# Patient Record
Sex: FEMALE | Race: ASIAN
[De-identification: names, ages, dates, MRNs, and addresses within clinical notes are randomized per-mention and may not be internally consistent; named-entity substitution may affect disease eponyms.]

---

## 2019-03-18 ENCOUNTER — HOSPITAL ENCOUNTER (INPATIENT)
Dept: HOSPITAL 74 - FM/S | Age: 74
LOS: 3 days | Discharge: HOME | DRG: 483 | End: 2019-03-21
Attending: ORTHOPAEDIC SURGERY | Admitting: ORTHOPAEDIC SURGERY
Payer: COMMERCIAL

## 2019-03-18 VITALS — BODY MASS INDEX: 30.2 KG/M2

## 2019-03-18 DIAGNOSIS — D72.829: ICD-10-CM

## 2019-03-18 DIAGNOSIS — G47.00: ICD-10-CM

## 2019-03-18 DIAGNOSIS — M19.011: ICD-10-CM

## 2019-03-18 DIAGNOSIS — M75.101: Primary | ICD-10-CM

## 2019-03-18 DIAGNOSIS — M79.7: ICD-10-CM

## 2019-03-18 DIAGNOSIS — E87.1: ICD-10-CM

## 2019-03-18 DIAGNOSIS — K21.9: ICD-10-CM

## 2019-03-18 DIAGNOSIS — G25.81: ICD-10-CM

## 2019-03-18 DIAGNOSIS — B02.29: ICD-10-CM

## 2019-03-18 DIAGNOSIS — M75.21: ICD-10-CM

## 2019-03-18 LAB
ANION GAP SERPL CALC-SCNC: 8 MMOL/L (ref 8–16)
BUN SERPL-MCNC: 11 MG/DL (ref 7–18)
CALCIUM SERPL-MCNC: 8.7 MG/DL (ref 8.5–10)
CHLORIDE SERPL-SCNC: 106 MMOL/L (ref 98–107)
CO2 SERPL-SCNC: 21 MMOL/L (ref 21–32)
CREAT SERPL-MCNC: 0.7 MG/DL (ref 0.55–1.3)
DEPRECATED RDW RBC AUTO: 12.7 % (ref 11.6–15.6)
GLUCOSE SERPL-MCNC: 160 MG/DL (ref 74–106)
HCT VFR BLD CALC: 32.7 % (ref 32.4–45.2)
HGB BLD-MCNC: 10.4 GM/DL (ref 10.7–15.3)
MCH RBC QN AUTO: 27.5 PG (ref 25.7–33.7)
MCHC RBC AUTO-ENTMCNC: 31.8 G/DL (ref 32–36)
MCV RBC: 86.4 FL (ref 80–96)
PLATELET # BLD AUTO: 434 K/MM3 (ref 134–434)
PMV BLD: 9.1 FL (ref 7.5–11.1)
POTASSIUM SERPLBLD-SCNC: 4.2 MMOL/L (ref 3.5–5.1)
RBC # BLD AUTO: 3.78 M/MM3 (ref 3.6–5.2)
SODIUM SERPL-SCNC: 135 MMOL/L (ref 136–145)
WBC # BLD AUTO: 16.9 K/MM3 (ref 4–10.8)

## 2019-03-18 PROCEDURE — 0RRJ0JZ REPLACEMENT OF RIGHT SHOULDER JOINT WITH SYNTHETIC SUBSTITUTE, OPEN APPROACH: ICD-10-PCS | Performed by: ORTHOPAEDIC SURGERY

## 2019-03-18 PROCEDURE — 0RPJ0JZ REMOVAL OF SYNTHETIC SUBSTITUTE FROM RIGHT SHOULDER JOINT, OPEN APPROACH: ICD-10-PCS | Performed by: ORTHOPAEDIC SURGERY

## 2019-03-18 PROCEDURE — 0LS30ZZ REPOSITION RIGHT UPPER ARM TENDON, OPEN APPROACH: ICD-10-PCS | Performed by: ORTHOPAEDIC SURGERY

## 2019-03-18 RX ADMIN — DOCUSATE SODIUM SCH MG: 100 CAPSULE, LIQUID FILLED ORAL at 14:10

## 2019-03-18 RX ADMIN — OXYCODONE HYDROCHLORIDE SCH MG: 10 TABLET, FILM COATED, EXTENDED RELEASE ORAL at 21:29

## 2019-03-18 RX ADMIN — ACETAMINOPHEN SCH MG: 325 TABLET ORAL at 20:50

## 2019-03-18 RX ADMIN — DOCUSATE SODIUM SCH MG: 100 CAPSULE, LIQUID FILLED ORAL at 21:28

## 2019-03-18 RX ADMIN — CEFAZOLIN SODIUM SCH MLS/HR: 2 SOLUTION INTRAVENOUS at 16:15

## 2019-03-18 NOTE — OPR
Date of Procedure: 3/18/2019





Procedure: Right Shoulder-


1. Reverse total shoulder arthroplasty (03757)


2. Biceps tenodesis (16879)





Preoperative Diagnoses: 


1. Glenohumeral osteoarthritis


2. Rotator cuff tear


3. Biceps tendonitis/degeneration 





Postoperative Diagnoses: 


1. Glenohumeral osteoarthritis


2. Rotator cuff tear


3. Biceps tendonitis/degeneration 





Surgeon: Grover Vazquez DO





Assistants: Dileep Dyson DO





Anesthesia: General endotracheal anesthesia, IV regional with interscalene 

nerve block preop





Estimated Blood Loss: 50 cubic centimeters


       


Total IV Fluids: Per anesthesia record


       


Specimens: None


       


Implants: [employer] Humelock Reversed System


24 mm baseplate with +6 mm post extension


4.5 mm x 20 mm locking screw x 4


36 mm, 10-degree tilt centered glenosphere with 3.5 mm offset


32mm/8mm humeral stem


22 mm standard screw


+3 polyethylene insert with no build up





Complications:  None


       


Disposition: PACU


       


Condition: Hemodynamically stable





Indications: Nba Zavala presented to us with the above noted diagnoses. She 

ultimately elected to proceed with surgical intervention after discussion of 

the risks, benefits, alternatives. We discussed risks including but not limited 

to, bleeding, pain, infection, scarring, damage to neurovascular structures, 

blood clots, pulmonary embolus, need for additional surgery, complications of 

the prosthesis including breakage or failure, incomplete relief of pain, and 

incomplete return of function. She expressed understanding and wished to 

proceed. We also discussed the risks and benefits of a conservative or non-

surgical approach. We discussed risks including ongoing pain and dysfunction, 

or worsening pain and shoulder function.


 


She underwent preoperative medical evaluation, clearance, and optimization 

prior to surgery.





Procedure Details: 


She was identified in the preoperative area. The shoulder was marked as the 

operative site and consent was completed and confirmed. An interscalene block 

was performed by the anesthesia department. 


 


She was later transferred to the operating room and placed in supine position 

the operating room. General anesthesia was induced without difficulty. She was 

repositioned into beach chair with all bony prominences appropriately padded. 

The neck was in neutral alignment. 


 


A surgical time-out was performed identifying the correct patient, procedure, 

and site.


 


Antibiotics were given within 1 hour prior to surgical incision.


 


The upper extremity was prepped and draped in standard sterile fashion.





Reverse total shoulder arthroplasty with biceps tenodesis: 





Examination under anesthesia: Examination under anesthesia demonstrated stable 

supple shoulder. At the time of surgery, there was thinning of the 

supraspinatus and infraspinatus with a partial thickness tear of the 

supraspinatus. The subscapularis was attenuated, but intact. The humeral head 

was devoid of articular cartilage diffusely and it was significantly 

posteriorly subluxed. The glenoid face also had articular cartilage damage, 

significant posterior wear, and significant synovitis. The glenoid was of small 

size. There was significant inferior capsular scarring. The biceps tendon was 

degenerated. 





We began the procedure with a deltopectoral incision. Sharp dissection was 

carried down through the subcutaneous tissue down to the level of the fascia 

overlying the deltoid. Subcutaneous flaps were mobilized and developed and the 

deltopectoral interval was identified with cephalic vein, dissected and 

medially mobilized. Cephalic vein was protected throughout the case and was 

intact after the completion of the case. The biceps tendon was identified in 

the bicipital groove, it was clearly degenerated. We transected the biceps 

tendon distally and tenodesis into the upper border of the pectoralis major 

using a number 5 Ethibond suture in a figure of eight stitch fashion. Extra 

length of the tendon was then resected and it is entered into the rotator cuff 

interval. The supraspinatus and infraspinatus were completely absent and there 

was only some bursal and capsular tissue remaining. 





The subscapularis was then tagged with #5 Ethibond stitch and that was resected 

off the lesser tuberosity with electrocautery. Humeral head was then able to 

dislocate fully anteriorly as we completely removed the subscapularis and 

released the capsular insertion along the anatomic neck. 





A sagittal saw was then used to resect the anatomic neck of the humeral head 

and the canal was prepared with T-handle reamers and broaches. We then 

sequentially sized up to size 32/8 broach and found this to have a good fit. We 

used a planing reamer to clean up our cut to be in line with the stem. We set 

it to her natural retroversion of about 30 degrees. Attention was then directed 

towards the glenoid preparation. 





Fukuda retractor was placed and the humeral shaft was retracted posteriorly. 

Subscapularis was identified and the long anterior glenoid retractor was placed 

to identify the axillary nerve. We placed the hohmann retractor over and 

protected it throughout our dissection of the glenoid either directly with 

visualization or with palpation with our finger. We then performed the global 

release of the subscapularis releasing adhesion from beneath the coracoid into 

the rotator cuff interval and glenoid. After complete global release, we 

circumferentially resected the labrum and reset the triceps origin which gave 

excellent exposure of the glenoid. There were some osteophytes which were 

carefully resected. 





We used the peg guide for the center of the glenoid and we reamed the inferior 

portion for the preparation of the glenosphere baseplate with slight inferior 

inclination. We then used appropriate guides to drill the center peg hole and 

we finally were able to fit a 24mm, +6 mm peg baseplate. Baseplate was then 

inserted down the proper orientation. Screws were then drilled for the 4.5 mm 

screws. We drilled the anterior and posterior screws first followed by the 

superior and inferior locking screws with max length based on the depth gauge.  

The screws had excellent purchase and bite. We found the 36mm glenosphere with 

lateral offset to be appropriately sized for the patient. We thoroughly 

irrigated this area and ultimately selected the 36 mm, 10-degree tilt 

glenosphere with 3.5 mm offset. This was inserted and impacted down without 

difficulty and it fits nicely without any impingement. 





We then turned our attention to the humerus dislocating it anteriorly and tried 

different humeral components. Ultimately, we selected a +3 polyethylene insert 

with no build up and placed it on the humeral stem. We trialed this and found 

excellent range of motion. No gaping or shuck and we selected the real 

components. 





We then prepared the humeral canal after removing the trial broach stem and 

dislocating the prior reduction of trial components. I thoroughly irrigated the 

humeral canal and I prepared it for the impaction of real stem component. Drill 

holes were made within the bicipital groove along the insertion of the 

subscapularis and somewhat more medial such that we will not have to pull the 

subscapularis all the way to its native insertion at the lesser tuberosity and 

we medialized it with the repair to some extent. #5 Ethibond sutures were 

placed in the humeral holes. They were wrapped around the neck of the real 

component and the real component was seated nicely into the humeral canal with 

good stability and rotational stability. The humeral tray was then impacted in 

and dialed into the appropriate location and finally the polyethylene insert 

was placed. We then reduced the humerus on the glenoid and took the onset of 

range of motion without any evidence of instability or significant impingement.





The subscapularis was then repaired with the transosseous sutures in a Javier-

Eugene stitch configuration nicely repairing back into the anterior proximal 

humerus just covering the lesser tuberosity and medial to the lesser 

tuberosity. Extra length of the sutures were then cut and removed from the 

wound. A #5 Ethibond was also placed through the partially torn rotator cuff 

and this was repaired to the greater tuberosity via bone tunnels.





Remainder of the incision was closed with a #2 Ethibond stitch running fashion 

for the deltopectoral interval keeping the vein superior to this level and then 

the wound was closed in a layered fashion with buried 2-0 Vicryl stitches, 3-0 

Monocril skin closure, and then Dermabond. The shoulder was sterilely dressed, 

placed in a shoulder immobilizer. 





Post-operative Details: 


Postoperative rehabilitation: Reverse TSA protocol. She will remain in a sling 

for 4 weeks. PROM exercises for 4 weeks; No pendulums early. No strengthening 

for at least 4 months. 





Attestation for first assistant: Dr. Dileep Dyson DO acted as the first 

assistant. There was no qualified resident available to do so.

## 2019-03-19 LAB
ANION GAP SERPL CALC-SCNC: 8 MMOL/L (ref 8–16)
BASOPHILS # BLD: 0.2 % (ref 0–2)
BUN SERPL-MCNC: 16 MG/DL (ref 7–18)
CALCIUM SERPL-MCNC: 8.7 MG/DL (ref 8.5–10)
CHLORIDE SERPL-SCNC: 101 MMOL/L (ref 98–107)
CO2 SERPL-SCNC: 25 MMOL/L (ref 21–32)
CREAT SERPL-MCNC: 0.6 MG/DL (ref 0.55–1.3)
DEPRECATED RDW RBC AUTO: 12.8 % (ref 11.6–15.6)
EOSINOPHIL # BLD: 0 % (ref 0–4.5)
GLUCOSE SERPL-MCNC: 112 MG/DL (ref 74–106)
HCT VFR BLD CALC: 29.3 % (ref 32.4–45.2)
HGB BLD-MCNC: 9.5 GM/DL (ref 10.7–15.3)
LYMPHOCYTES # BLD: 11.2 % (ref 8–40)
MCH RBC QN AUTO: 28 PG (ref 25.7–33.7)
MCHC RBC AUTO-ENTMCNC: 32.4 G/DL (ref 32–36)
MCV RBC: 86.4 FL (ref 80–96)
MONOCYTES # BLD AUTO: 10 % (ref 3.8–10.2)
NEUTROPHILS # BLD: 78.6 % (ref 42.8–82.8)
PLATELET # BLD AUTO: 402 K/MM3 (ref 134–434)
PMV BLD: 9.6 FL (ref 7.5–11.1)
POTASSIUM SERPLBLD-SCNC: 4.4 MMOL/L (ref 3.5–5.1)
RBC # BLD AUTO: 3.39 M/MM3 (ref 3.6–5.2)
SODIUM SERPL-SCNC: 134 MMOL/L (ref 136–145)
WBC # BLD AUTO: 13 K/MM3 (ref 4–10.8)

## 2019-03-19 RX ADMIN — DOCUSATE SODIUM SCH MG: 100 CAPSULE, LIQUID FILLED ORAL at 21:46

## 2019-03-19 RX ADMIN — VENLAFAXINE SCH MG: 37.5 TABLET ORAL at 21:46

## 2019-03-19 RX ADMIN — ACETAMINOPHEN SCH MG: 325 TABLET ORAL at 02:00

## 2019-03-19 RX ADMIN — DOCUSATE SODIUM SCH MG: 100 CAPSULE, LIQUID FILLED ORAL at 13:41

## 2019-03-19 RX ADMIN — ASPIRIN SCH MG: 325 TABLET ORAL at 10:03

## 2019-03-19 RX ADMIN — ACETAMINOPHEN SCH MG: 325 TABLET ORAL at 21:46

## 2019-03-19 RX ADMIN — CEFAZOLIN SODIUM SCH MLS/HR: 2 SOLUTION INTRAVENOUS at 00:34

## 2019-03-19 RX ADMIN — OXYCODONE HYDROCHLORIDE SCH MG: 10 TABLET, FILM COATED, EXTENDED RELEASE ORAL at 21:46

## 2019-03-19 RX ADMIN — PANTOPRAZOLE SODIUM SCH MG: 40 TABLET, DELAYED RELEASE ORAL at 16:47

## 2019-03-19 RX ADMIN — ONDANSETRON PRN MG: 2 INJECTION INTRAMUSCULAR; INTRAVENOUS at 23:53

## 2019-03-19 RX ADMIN — VENLAFAXINE SCH MG: 37.5 TABLET ORAL at 11:39

## 2019-03-19 RX ADMIN — ROPINIROLE SCH MG: 1 TABLET, FILM COATED ORAL at 21:48

## 2019-03-19 RX ADMIN — SENNOSIDES SCH TAB: 8.6 TABLET, FILM COATED ORAL at 10:04

## 2019-03-19 RX ADMIN — OXYCODONE HYDROCHLORIDE SCH: 10 TABLET, FILM COATED, EXTENDED RELEASE ORAL at 10:04

## 2019-03-19 RX ADMIN — DOCUSATE SODIUM SCH MG: 100 CAPSULE, LIQUID FILLED ORAL at 06:24

## 2019-03-19 RX ADMIN — ACETAMINOPHEN SCH MG: 325 TABLET ORAL at 13:41

## 2019-03-19 RX ADMIN — ACETAMINOPHEN SCH MG: 325 TABLET ORAL at 08:41

## 2019-03-19 NOTE — PN
Progress Note (short form)





- Note


Progress Note: 





 ORTHOPEDIC SURGERY PROGRESS NOTE


                             Department of Orthopedic Surgery





SUBJECTIVE





No acute events overnight. No complaints currently. Denies chest pain, 

shortness of breath, or calf pain. No nausea or vomiting. Tolerating oral 

intake. Pain control difficult overnight, but improving. 





PHYSICAL EXAMINATION


General: Alert, oriented, cooperative and no distress.





Upper Extremity: Dressing intact; No drainage. Shoulder immobilizer; 

Compartments soft. M/R/U/MSK/AX motor intact; SILT distally; 2+ radial pulses; 

Cap refill brisk.





DVT Exam: No evidence of DVT seen on physical exam; No cords or calf tenderness

; No significant calf/ankle edema.








Intake & Output











 03/17/19 03/18/19 03/19/19





 23:59 23:59 23:59


 


Intake Total  2550 


 


Output Total  300 


 


Balance  2250 


 


Intake:   


 


  IV  1900 


 


  IVPB  250 


 


  Oral  400 


 


Output:   


 


  Urine  300 


 


    Void  300 


 


Other:   


 


  Voiding Method  Toilet Toilet


 


  Bowel Movement  No 


 


  Weight  155 lb 


 


  Height  5 ft 


 


  Body Mass Index (BMI)  30.2 


 


  Weight Measurement Method  Standing Scale 








Active Medications











Generic Name Dose Route Start Last Admin





  Trade Name Freq  PRN Reason Stop Dose Admin


 


Acetaminophen  650 mg  03/18/19 20:00  03/19/19 02:00





  Tylenol -  PO  03/21/19 19:59  650 mg





  Q6H JUSTIN   Administration





     





     





     





     


 


Al Hydroxide/Mg Hydroxide  30 ml  03/18/19 14:07  





  Mylanta Oral Suspension -  PO   





  Q6H PRN   





  INDIGESTION   





     





     





     


 


Aspirin  325 mg  03/19/19 10:00  





  Ecotrin -  PO   





  DAILY JUSTIN   





     





     





     





     


 


Docusate Sodium  100 mg  03/18/19 14:00  03/19/19 06:24





  Colace -  PO   100 mg





  TID JUSITN   Administration





     





     





     





     


 


Magnesium Hydroxide  30 ml  03/18/19 14:08  





  Milk Of Magnesia -  PO   





  DAILY PRN   





  CONSTIPATION   





     





     





     


 


Morphine Sulfate  4 mg  03/18/19 14:11  





  Morphine Sulfate  IVPUSH   





  Q6H PRN   





  PAIN LEVEL 6-10 BREAKTHRU PAIN   





     





     





     


 


Ondansetron HCl  4 mg  03/18/19 09:37  





  Zofran Injection  IVPUSH   





  Q6H PRN   





  NAUSEA AND/OR VOMITING   





     





     





     


 


Oxycodone HCl  10 mg  03/18/19 22:00  03/18/19 21:29





  Oxycontin -  PO   10 mg





  BID JUSTIN   Administration





     





     





     





     


 


Oxycodone HCl  5 mg  03/18/19 13:00  





  Roxicodone -  PO  03/21/19 13:00  





  Q4H PRN   





  PAIN LEVEL 1-5   





     





     





     


 


Oxycodone HCl  10 mg  03/18/19 13:02  03/18/19 18:15





  Roxicodone -  PO  03/21/19 13:02  10 mg





  Q4H PRN   Administration





  PAIN LEVEL 6-10   





     





     





     


 


Senna  2 tab  03/19/19 10:00  





  Senna -  PO   





  DAILY JUSTIN   





     





     





     





     








Vital Signs (last)











Temp Pulse Resp BP Pulse Ox


 


 98.2 F   82   18   98/62   95 


 


 03/19/19 06:04  03/19/19 06:04  03/19/19 06:04  03/19/19 06:04  03/19/19 06:04








Laboratory





 03/19/19 06:30 





 03/18/19 13:20 





IMAGING


Radiographs of the right shoulder show shoulder prosthesis is in good position. 

No evidence of loosening.


 


ASSESSMENT AND PLAN





Nba Zavala is a 73 year old female status post right reverse total shoulder 

arthroplasty. POD #1





Doing well postoperatively, progressing as expected.





- Pain control: minimize narcotic use


- DVT prophylaxis; SCDs; Aspirin 325 mg daily x 3 weeks


- Ice


- Elevate HOB, encourage oral intake


- PT/OT


- Dispo planning. Return to the office in 10 days.

## 2019-03-19 NOTE — PN
Progress Note (short form)





- Note


Progress Note: 





Anesthesiology:





POD #1 - s/p right total/reverse shoulder replacement. VSS. Pt. doing well, 

sitting up in bed receiving some rehab.


No complaints. No apparent anesthetic complications noted. Continue current 

care.

## 2019-03-19 NOTE — CONSULT
Consultation: 


REQUESTING PROVIDER: Dr. Grover Vazquez





CONSULT REQUEST: We have been asked to medically evaluate this patient post-

operatively.





HISTORY OF PRESENT ILLNESS:


73 year-old female with a PMH significant for fibromyalgia, post-herpetic 

neuralgia, restless leg syndrome, insomnia, GERD and degenerative joint disease 

s/p right reverse total shoulder replacement and bicep tendonesis on 3/18/19. 








REVIEW OF SYSTEMS:


CONSTITUTIONAL: 


Absent:  fever, chills, diaphoresis, generalized weakness, malaise, loss of 

appetite, weight change


HEENT: 


Absent:  rhinorrhea, nasal congestion, throat pain, throat swelling, difficulty 

swallowing, mouth swelling, ear pain, eye pain, visual changes


CARDIOVASCULAR: 


Absent: chest pain, syncope, palpitations, irregular heart rate, lightheadedness

, peripheral edema


RESPIRATORY: 


Absent: cough, shortness of breath, dyspnea with exertion, orthopnea, wheezing, 

stridor, hemoptysis


GASTROINTESTINAL:


Absent: abdominal pain, abdominal distension, nausea, vomiting, diarrhea, 

constipation, melena, hematochezia


GENITOURINARY: 


Absent: dysuria, frequency, urgency, hesitancy, hematuria, flank pain, genital 

pain


MUSCULOSKELETAL: 


+right shoulder pain and heaviness following surgery; pain is different in 

character and not as severe as pre-op 


Absent: myalgia, arthralgia, joint swelling, back pain, neck pain


SKIN: 


Absent: rash, itching, pallor


HEMATOLOGIC/IMMUNOLOGIC: 


Absent: easy bleeding, easy bruising, lymphadenopathy, frequent infections


ENDOCRINE:


Absent: unexplained weight gain, unexplained weight loss, heat intolerance, 

cold intolerance


NEUROLOGIC: 


+restless leg syndrome symptoms (did not get ropironole last night) with 

associated sleeplessness


Absent: headache, focal weakness or paresthesias, dizziness, unsteady gait, 

seizure, mental status changes, bladder or bowel incontinence


PSYCHIATRIC: 


Absent: anxiety, depression, suicidal or homicidal ideation, hallucinations.





PHYSICAL EXAMINATION





 Vital Signs - 24 hr











  03/18/19 03/18/19 03/18/19





  13:25 13:40 14:38


 


Temperature  98 F 98.3 F


 


Pulse Rate 96 H 96 H 92 H


 


Respiratory 22 H 21 H 18





Rate   


 


Blood Pressure 103/52 L 101/52 L 105/58 L


 


O2 Sat by Pulse 98  98





Oximetry (%)   














  03/18/19 03/18/19 03/19/19





  18:00 22:00 06:04


 


Temperature 98.3 F 98.5 F 98.2 F


 


Pulse Rate 80 89 82


 


Respiratory 16 17 18





Rate   


 


Blood Pressure 124/60 107/54 L 98/62


 


O2 Sat by Pulse 94 L 95 95





Oximetry (%)   














  03/19/19





  09:54


 


Temperature 98.3 F


 


Pulse Rate 89


 


Respiratory 19





Rate 


 


Blood Pressure 95/50 L


 


O2 Sat by Pulse 





Oximetry (%) 














GENERAL: Awake, alert, and fully oriented, in no acute distress.


LUNGS: Anterior breath sounds equal, clear to auscultation bilaterally. No 

wheezes, and no crackles. No accessory muscle use.


HEART: Regular rate and rhythm, S1 and S2 


ABDOMEN: Soft, not tender, not distended, normoactive bowel sounds, no guarding

, no rebound tenderness  


MUSCULOSKELETAL: Normal range of motion at all joints. No bony deformities or 

tenderness. No CVA tenderness.


RIGHT UPPER EXTREMITy: shoulder surgical dressing c/d/i, surgical wound not 

visualized; 2+ radial pulse, warm, well-perfused; sling


LOWER EXTREMITIES: 2+ pulses, warm, well-perfused. No calf tenderness. No 

peripheral edema. 


NEUROLOGICAL:  Cranial nerves II-XII intact. Normal speech. 








 Laboratory Results - last 24 hr











  03/18/19 03/18/19 03/19/19





  13:20 13:20 06:30


 


WBC  16.9 H   13.0 H


 


RBC  3.78   3.39 L


 


Hgb  10.4 L   9.5 L


 


Hct  32.7   29.3 L


 


MCV  86.4   86.4


 


MCH  27.5   28.0


 


MCHC  31.8 L   32.4


 


RDW  12.7   12.8


 


Plt Count  434   402


 


MPV  9.1   9.6


 


Absolute Neuts (auto)    10.2


 


Neutrophils %    78.6


 


Lymphocytes %    11.2


 


Monocytes %    10.0


 


Eosinophils %    0.0


 


Basophils %    0.2


 


Sodium   135 L 


 


Potassium   4.2 


 


Chloride   106 


 


Carbon Dioxide   21 


 


Anion Gap   8 


 


BUN   11 


 


Creatinine   0.7 


 


Creat Clearance w eGFR   82.49 


 


Random Glucose   160 H 


 


Calcium   8.7 














  03/19/19





  10:28


 


WBC 


 


RBC 


 


Hgb 


 


Hct 


 


MCV 


 


MCH 


 


MCHC 


 


RDW 


 


Plt Count 


 


MPV 


 


Absolute Neuts (auto) 


 


Neutrophils % 


 


Lymphocytes % 


 


Monocytes % 


 


Eosinophils % 


 


Basophils % 


 


Sodium  134 L


 


Potassium  4.4


 


Chloride  101


 


Carbon Dioxide  25


 


Anion Gap  8


 


BUN  16


 


Creatinine  0.6


 


Creat Clearance w eGFR  98.55


 


Random Glucose  112 H


 


Calcium  8.7








                           


 Current Medications











Generic Name Dose Route Start Last Admin





  Trade Name Freq  PRN Reason Stop Dose Admin


 


Acetaminophen  650 mg  03/18/19 20:00  03/19/19 13:41





  Tylenol -  PO  03/21/19 19:59  650 mg





  Q6H JUSTIN   Administration





     





     





     





     


 


Al Hydroxide/Mg Hydroxide  30 ml  03/18/19 14:07  





  Mylanta Oral Suspension -  PO   





  Q6H PRN   





  INDIGESTION   





     





     





     


 


Aspirin  325 mg  03/19/19 10:00  03/19/19 10:03





  Ecotrin -  PO   325 mg





  DAILY JUSTIN   Administration





     





     





     





     


 


Docusate Sodium  100 mg  03/18/19 14:00  03/19/19 13:41





  Colace -  PO   100 mg





  TID JUSTIN   Administration





     





     





     





     


 


Magnesium Hydroxide  30 ml  03/18/19 14:08  





  Milk Of Magnesia -  PO   





  DAILY PRN   





  CONSTIPATION   





     





     





     


 


Morphine Sulfate  4 mg  03/18/19 14:11  





  Morphine Sulfate  IVPUSH   





  Q6H PRN   





  PAIN LEVEL 6-10 BREAKTHRU PAIN   





     





     





     


 


Ondansetron HCl  4 mg  03/18/19 09:37  





  Zofran Injection  IVPUSH   





  Q6H PRN   





  NAUSEA AND/OR VOMITING   





     





     





     


 


Oxycodone HCl  10 mg  03/18/19 22:00  03/19/19 10:04





  Oxycontin -  PO   Not Given





  BID [facility]   





     





     





     





     


 


Oxycodone HCl  5 mg  03/18/19 13:00  





  Roxicodone -  PO  03/21/19 13:00  





  Q4H PRN   





  PAIN LEVEL 1-5   





     





     





     


 


Oxycodone HCl  10 mg  03/18/19 13:02  03/18/19 18:15





  Roxicodone -  PO  03/21/19 13:02  10 mg





  Q4H PRN   Administration





  PAIN LEVEL 6-10   





     





     





     


 


Pantoprazole Sodium  40 mg  03/19/19 16:15  





  Protonix -  PO   





  DAILY [facility]   





     





     





     





     


 


Pregabalin  75 mg  03/20/19 22:00  





  Lyrica -  PO   





  HS JUSTIN   





     





     





     





     


 


Ropinirole HCl  2 mg  03/19/19 22:00  





  Requip -  PO   





  HS JUSTIN   





     





     





     





     


 


Senna  2 tab  03/19/19 10:00  03/19/19 10:04





  Senna -  PO   2 tab





  DAILY JUSTIN   Administration





     





     





     





     


 


Venlafaxine HCl  37.5 mg  03/19/19 10:00  03/19/19 11:39





  Effexor -  PO   37.5 mg





  BID JUSTIN   Administration





     





     





     





     














ASSESSMENT/PLAN:


73 year-old female with a PMH significant for fibromyalgia, post-herpetic 

neuralgia, restless leg syndrome, GERD and degenerative joint disease s/p right 

reverse total shoulder replacement and bicep tendonesis on 3/18/19. 





Reverse total shoulder replacement and bicep tendonesis


                --POD #1


 --perioperative antibiotics complete (Vanc x 1, cefazolin x 2)


 --pain management per surgery


 --ASA 325mg daily


 --bowel regimen


 --incentive spirometry





Resless leg syndrome


   --did not get evening dose of ropironole last night and had difficult night


   --ropirinole 2mg at bedtime tonight





Fibromyalgia


Post-herpetic neuralgia


   --takes pregabalin/Lyrica 75mg at bedtime but received a dose this morning 

at 10:00am; hold this evening's dose


   --continue venlafaxine 





GERD


   --resume protonix





Low sodium


   --Na 134, down from 136 yesterday (corrected)


   --renal function is stable


   --monitor





Leukocytosis


   --likely reactive from surgery, trending down


   


FEN


 Fluids: PO intake adequate


 Electrolytes: replete as indicated


 Nutrition: regular diet





DVT prophylaxis: OOB, ambulation, SCDs, ASA 325mg daily





Physical therapy





Dispo: We will continue to follow the patient. Thank you for this consultative 

opportunity.











Visit type





- Emergency Visit


Emergency Visit: No





- New Patient


This patient is new to me today: Yes


Date on this admission: 03/19/19





- Critical Care


Critical Care patient: No

## 2019-03-19 NOTE — DS
Physical Examination


Vital Signs: 


 Vital Signs











Temperature  98.3 F   03/19/19 09:54


 


Pulse Rate  89   03/19/19 09:54


 


Respiratory Rate  19   03/19/19 09:54


 


Blood Pressure  95/50 L  03/19/19 09:54


 


O2 Sat by Pulse Oximetry (%)  95   03/19/19 06:04











Constitutional: Yes: Well Nourished, No Distress


Cardiovascular: Yes: Regular Rate and Rhythm, Other (/63)


Respiratory: Yes: Regular


Gastrointestinal: Yes: Soft


Peripheral Pulses: Left Radial: 2+, Right Radial: 2+


Wound/Incision: Yes: Clean/Dry, Well Approximated


Neurological: Yes: WNL


Labs: 


 CBC, BMP





 03/19/19 06:30 





 03/19/19 10:28 











Discharge Summary


Reason For Visit: RIGHT SHOULDER ADVANCED DEGENERATIVE DISEASE


Hospital Course: 


Nba Zavala was admitted on 3/18/2019 for elective joint replacement surgery. 

The patient was taken to the operating room and underwent a right reverse total 

shoulder arthoplasty. The patient tolerated the procedure without complications 

(see operative note for details).  After surgery, the patient was taken to the 

PACU, and when stable, transferred to the orthopedic unit for further care. The 

patient was given prophylactic antibiotics in the perioperative phase. Post-

operatively, the patient was placed on sequential compression devices and 

appropriate chemoprophylaxis for deep vein thrombosis prophylaxis along with 

early remobilization efforts. Intravenous pain medication was converted to oral 

pain medications which the patient tolerated well. The patient tolerated 

advancement to a regular diet. Bowel program was initiated. Bladder output was 

monitored. Incentive spirometry was given for deep breathing therapy. Physical 

therapy was provided and the patient was discharged per pathway.





Condition: Stable





- Instructions


Diet, Activity, Other Instructions: 


Change the bandage on your shoulder daily using clean, dry gauze and tape. 

After 5 to 7 days, you can leave the incision undressed if the area remains dry.





You should keep your incision dry (no shower or bath) until 5 days after your 

surgery, at which time you can begin to shower. Do not bathe, swim, or use hot 

tubs for at least 3 weeks after your surgery. Keep a clean wash cloth in your 

underarm (armpit) between showers to keep this area dry, absorb sweat, and 

prevent skin infections. Make sure you wash and completely dry this area daily.





You should wear the immobilizer on the outside of your clothes. A large Tshirt 

or button-down shirts are the easiest to get on. Wearing your brace on your 

bare skin may cause a rash. Make sure the arm strap and wrist strap are not too 

tight. You should be able to easily fit four fingers from your opposite hand 

between the straps and your skin. Some mild swelling of the entire arm the week 

after surgery is normal. If you are experiencing increased swelling of the arm 

between the straps, you are likely wearing the brace straps too tight.





You should wear the brace most of the day, unless you are showering or have 

released the straps to do your exercises, write/type, or feed yourself. You 

must wear your immobilizer at night for the first 5-6 weeks after your surgery. 

Always keep a pillow behind you elbow when sitting or sleeping for the first 6 

weeks after surgery. Note: If you are given an abduction pillow after surgery 

to wear instead of a brace, it is important that you do not let your arm get 

closer to your body than the pillow would normally allow. When removing the 

brace, an additional person must be available to hold the arm in the air.





Unless you are told differently, it is okay to do some lightweight activities 

with your arm while it is in the brace, as long as you keep the arm in proper 

alignment. You can use your hand and fingers to do activities like eating, 

reading a book, or typing on a computer, as long as the arm is kept in front of 

your abdomen and at table top level.





Do not reach out to the side, behind you, or away from your body. This motion 

is called external rotation and puts you at risk for dislocation of your 

prosthesis.





You should not lift anything heavier than a coffee cup (8 ounces). 





Driving: Do not drive until discussing this with the doctor at your first 

return visit to the office. You should be able to drive when you are no longer 

taking narcotic pain medications, and feel that you can control the wheel. This 

is around 3-4 weeks for most patients. Exercise: Do not run, bike, or do any 

other lower body workouts until after


you see the surgeon at the first postoperative visit at the office. Do not Fall

: Take all precautions possible to AVOID FALLING. See the preventing falls at 

home handout provided by your nurse.





You will have a prescription for pain medication to take home with you. It is 

important to take these medications as directed and to only take them as 

necessary for pain.


If the medicine is making you too sleepy or dizzy then cut back on the number 

of pills you are taking or do not take them as often. If you continue to have 

dizziness, please call your primary care physician or go to the emergency room.


Narcotic pain medicines can cause nausea or upset stomach, constipation and 

difficulty with urination (passing your urine). Taking these medicines with 

food may help if they are upsetting your stomach. If you are experiencing 

constipation, try drinking more water, maintaining a high fiber diet, and using 

over the counter stool softeners as directed to ease this side effect. If you 

are having problems urinating or are urinating only small amounts often, we 

recommend you contact your primary care doctor, your urologist (if you have one)

, or go to the emergency room.





Please do not lose your prescriptions or pain medicines. Do not drive or 

operate heavy machinery while taking these medications.





You should follow the instructions given to you during your hospital stay. 

Depending on what surgery was done, some patients may not be allowed to do all 

exercises. In most patients it is fine to release the immobilizer to move your 

fingers, wrist and elbow to prevent stiffness. Only move your shoulder as 

instructed by your therapist and surgeon.  You MAY NOT do pendulums.  It is OK 

for you to lean forward and have your arm dangle for cleaning the armpit. Also 

consider icing your shoulder after doing your exercises.





It is important to work on your finger/wrist/elbow range of motion 3-4 times a 

day for about 2-3 minutes at a time. Make sure you are working on getting your 

elbow completely straight, as it can stiffen up quickly.





The most common problem after a reverse total shoulder replacement is 

dislocation of the prosthesis. Dislocation is when the 2 pieces of the inserted 

hardware do not meet anymore. If you should suddenly feel the parts shift or 

have a sudden increase in pain, contact the office immediately. This pain will 

be different from the pain you had after surgery. To prevent this, it is very 

important that you do not use the arm more than you were instructed in the 

hospital. Do not let your elbow fall behind you and it is extremely important 

to have a pillow behind your elbow when sleeping or sitting to keep the elbow 

in front of your body. Call the office immediately (or go to the emergency room 

if not during office hours) if you have any of the following symptoms:


   Any drainage or bleeding from your incision.


   Fever of 101.5 F or higher


   Chills


   Numbness, tingling, or loss of feeling to the arm or fingertips that does 

not improve with repositioning.


   Increased pain that is not relieved by pain medicine





Go to the Emergency Room immediately if you experience any CHEST PAIN or 

SHORTNESS OF BREATH, as these symptoms can be a sign of a life threatening 

condition.





Disposition: HOME





- Home Medications


Comprehensive Discharge Medication List: 


Ambulatory Orders





Oxycodone 5 mg PO Q4 hours for pain as needed


Aspirin 325 mg PO DAILY for 21 days


Pantoprazole Sodium 40 mg PO DAILY 03/15/19 


Pregabalin [Lyrica] 75 mg PO HS 03/15/19 


Acetaminophen [Tylenol] 650 mg PO BID PRN 03/18/19

## 2019-03-20 LAB
ALBUMIN SERPL-MCNC: 2.9 G/DL (ref 3.4–5)
ALP SERPL-CCNC: 63 U/L (ref 45–117)
ALT SERPL-CCNC: 15 U/L (ref 13–61)
ANION GAP SERPL CALC-SCNC: 6 MMOL/L (ref 8–16)
AST SERPL-CCNC: 40 U/L (ref 15–37)
BASOPHILS # BLD: 0.3 % (ref 0–2)
BILIRUB SERPL-MCNC: 0.6 MG/DL (ref 0.2–1)
BUN SERPL-MCNC: 13 MG/DL (ref 7–18)
CALCIUM SERPL-MCNC: 8.4 MG/DL (ref 8.5–10)
CHLORIDE SERPL-SCNC: 101 MMOL/L (ref 98–107)
CO2 SERPL-SCNC: 25 MMOL/L (ref 21–32)
CREAT SERPL-MCNC: 0.6 MG/DL (ref 0.55–1.3)
DEPRECATED RDW RBC AUTO: 13.2 % (ref 11.6–15.6)
EOSINOPHIL # BLD: 0.2 % (ref 0–4.5)
GLUCOSE SERPL-MCNC: 125 MG/DL (ref 74–106)
HCT VFR BLD CALC: 29.1 % (ref 32.4–45.2)
HGB BLD-MCNC: 9.3 GM/DL (ref 10.7–15.3)
LYMPHOCYTES # BLD: 12.9 % (ref 8–40)
MAGNESIUM SERPL-MCNC: 1.9 MG/DL (ref 1.8–2.4)
MCH RBC QN AUTO: 27.6 PG (ref 25.7–33.7)
MCHC RBC AUTO-ENTMCNC: 31.9 G/DL (ref 32–36)
MCV RBC: 86.6 FL (ref 80–96)
MONOCYTES # BLD AUTO: 10.3 % (ref 3.8–10.2)
NEUTROPHILS # BLD: 76.3 % (ref 42.8–82.8)
PLATELET # BLD AUTO: 397 K/MM3 (ref 134–434)
PMV BLD: 9.6 FL (ref 7.5–11.1)
POTASSIUM SERPLBLD-SCNC: 4 MMOL/L (ref 3.5–5.1)
PROT SERPL-MCNC: 5.5 G/DL (ref 6.4–8.2)
RBC # BLD AUTO: 3.36 M/MM3 (ref 3.6–5.2)
SODIUM SERPL-SCNC: 132 MMOL/L (ref 136–145)
WBC # BLD AUTO: 13.2 K/MM3 (ref 4–10.8)

## 2019-03-20 RX ADMIN — ACETAMINOPHEN SCH MG: 325 TABLET ORAL at 13:47

## 2019-03-20 RX ADMIN — DOCUSATE SODIUM SCH MG: 100 CAPSULE, LIQUID FILLED ORAL at 13:47

## 2019-03-20 RX ADMIN — ROPINIROLE SCH MG: 1 TABLET, FILM COATED ORAL at 21:25

## 2019-03-20 RX ADMIN — VENLAFAXINE SCH MG: 37.5 TABLET ORAL at 10:36

## 2019-03-20 RX ADMIN — SODIUM CHLORIDE SCH MLS/HR: 9 INJECTION, SOLUTION INTRAVENOUS at 12:19

## 2019-03-20 RX ADMIN — PANTOPRAZOLE SODIUM SCH MG: 40 TABLET, DELAYED RELEASE ORAL at 10:36

## 2019-03-20 RX ADMIN — ACETAMINOPHEN SCH MG: 325 TABLET ORAL at 08:13

## 2019-03-20 RX ADMIN — ACETAMINOPHEN SCH MG: 325 TABLET ORAL at 20:00

## 2019-03-20 RX ADMIN — DOCUSATE SODIUM SCH MG: 100 CAPSULE, LIQUID FILLED ORAL at 06:46

## 2019-03-20 RX ADMIN — DOCUSATE SODIUM SCH MG: 100 CAPSULE, LIQUID FILLED ORAL at 21:24

## 2019-03-20 RX ADMIN — SENNOSIDES SCH TAB: 8.6 TABLET, FILM COATED ORAL at 10:37

## 2019-03-20 RX ADMIN — ONDANSETRON PRN MG: 2 INJECTION INTRAMUSCULAR; INTRAVENOUS at 08:11

## 2019-03-20 RX ADMIN — ACETAMINOPHEN SCH: 325 TABLET ORAL at 02:00

## 2019-03-20 RX ADMIN — OXYCODONE HYDROCHLORIDE SCH MG: 10 TABLET, FILM COATED, EXTENDED RELEASE ORAL at 21:24

## 2019-03-20 RX ADMIN — ASPIRIN SCH MG: 325 TABLET ORAL at 10:36

## 2019-03-20 RX ADMIN — VENLAFAXINE SCH MG: 37.5 TABLET ORAL at 21:24

## 2019-03-20 RX ADMIN — OXYCODONE HYDROCHLORIDE SCH: 10 TABLET, FILM COATED, EXTENDED RELEASE ORAL at 10:37

## 2019-03-20 NOTE — PN
Progress Note (short form)





- Note


Progress Note: 





 ORTHOPEDIC SURGERY PROGRESS NOTE


                             Department of Orthopedic Surgery





SUBJECTIVE





No acute events overnight. Complaining of some nausea this morning. No 

vomiting. Denies chest pain, shortness of breath, or calf pain. Tolerating oral 

intake. Pain control improving.  





PHYSICAL EXAMINATION


General: Alert, oriented, cooperative and no distress.





Upper Extremity: Dressing intact; No drainage. Shoulder immobilizer; 

Compartments soft. M/R/U/MSK/AX motor intact; SILT distally; 2+ radial pulses; 

Cap refill brisk.





DVT Exam: No evidence of DVT seen on physical exam; No cords or calf tenderness

; No significant calf/ankle edema.








Intake & Output











 03/18/19 03/19/19 03/20/19





 23:59 23:59 23:59


 


Intake Total 2550 1395 120


 


Output Total 300  


 


Balance 2250 1395 120


 


Intake:   


 


  IV 1900  


 


  IVPB 250  


 


  Oral 400 1395 120


 


Output:   


 


  Urine 300  


 


    Void 300  


 


Other:   


 


  Voiding Method Toilet Toilet Toilet


 


  # Unmeasured Voids   


 


    Void  1 100


 


  Bowel Movement No  No


 


  Weight 155 lb  


 


  Height 5 ft  


 


  Body Mass Index (BMI) 30.2  


 


  Weight Measurement Method Standing Scale  








Active Medications











Generic Name Dose Route Start Last Admin





  Trade Name Freq  PRN Reason Stop Dose Admin


 


Acetaminophen  650 mg  03/18/19 20:00  03/20/19 02:00





  Tylenol -  PO  03/21/19 19:59  Not Given





  Q6H JUSTIN   





     





     





     





     


 


Al Hydroxide/Mg Hydroxide  30 ml  03/18/19 14:07  





  Mylanta Oral Suspension -  PO   





  Q6H PRN   





  INDIGESTION   





     





     





     


 


Aspirin  325 mg  03/19/19 10:00  03/19/19 10:03





  Ecotrin -  PO   325 mg





  DAILY JUSTIN   Administration





     





     





     





     


 


Docusate Sodium  100 mg  03/18/19 14:00  03/20/19 06:46





  Colace -  PO   100 mg





  TID JUSTIN   Administration





     





     





     





     


 


Magnesium Hydroxide  30 ml  03/18/19 14:08  





  Milk Of Magnesia -  PO   





  DAILY PRN   





  CONSTIPATION   





     





     





     


 


Morphine Sulfate  4 mg  03/18/19 14:11  





  Morphine Sulfate  IVPUSH   





  Q6H PRN   





  PAIN LEVEL 6-10 BREAKTHRU PAIN   





     





     





     


 


Ondansetron HCl  4 mg  03/18/19 09:37  03/19/19 23:53





  Zofran Injection  IVPUSH   4 mg





  Q6H PRN   Administration





  NAUSEA AND/OR VOMITING   





     





     





     


 


Oxycodone HCl  10 mg  03/18/19 22:00  03/19/19 21:46





  Oxycontin -  PO   10 mg





  BID JUSTIN   Administration





     





     





     





     


 


Oxycodone HCl  5 mg  03/18/19 13:00  





  Roxicodone -  PO  03/21/19 13:00  





  Q4H PRN   





  PAIN LEVEL 1-5   





     





     





     


 


Oxycodone HCl  10 mg  03/18/19 13:02  03/18/19 18:15





  Roxicodone -  PO  03/21/19 13:02  10 mg





  Q4H PRN   Administration





  PAIN LEVEL 6-10   





     





     





     


 


Pantoprazole Sodium  40 mg  03/19/19 16:15  03/19/19 16:47





  Protonix -  PO   40 mg





  DAILY JUSTIN   Administration





     





     





     





     


 


Pregabalin  75 mg  03/20/19 22:00  





  Lyrica -  PO   





  HS JUSTIN   





     





     





     





     


 


Ropinirole HCl  2 mg  03/19/19 22:00  03/19/19 21:48





  Requip -  PO   2 mg





  HS JUSTIN   Administration





     





     





     





     


 


Senna  2 tab  03/19/19 10:00  03/19/19 10:04





  Senna -  PO   2 tab





  DAILY JUSTIN   Administration





     





     





     





     


 


Venlafaxine HCl  37.5 mg  03/19/19 10:00  03/19/19 21:46





  Effexor -  PO   37.5 mg





  BID JUSTIN   Administration





     





     





     





     








Vital Signs (last)











Temp Pulse Resp BP Pulse Ox


 


 98.3 F   75   18   123/66   94 L


 


 03/20/19 05:56  03/20/19 05:56  03/20/19 05:56  03/20/19 05:56  03/20/19 05:56














ASSESSMENT AND PLAN





Nba Zavala is a 73 year old female status post right reverse total shoulder 

arthroplasty. POD #2





Doing well postoperatively, progressing as expected.





- Zofran for nausea


- FU AM Labs


- Appreciate medicine consult


- Pain control: minimize narcotic use


- DVT prophylaxis; SCDs; Aspirin 325 mg daily x 3 weeks


- Ice


- Elevate HOB, encourage oral intake


- PT/OT


- Dispo planning. Return to the office in 10 days.

## 2019-03-20 NOTE — PN
Physical Exam: 


SUBJECTIVE: Patient seen and examined at bedside. Had some nausea this morning, 

feels better now. Pain is well-managed.








OBJECTIVE:





 Vital Signs











 Period  Temp  Pulse  Resp  BP Sys/Banuelos  Pulse Ox


 


 Last 24 Hr  98.2 F-100.2 F  74-87  18-19  123-153/62-82  93-98








GENERAL: Awake, alert, and fully oriented, in no acute distress.


LUNGS: Anterior breath sounds equal, clear to auscultation bilaterally. No 

wheezes, and no crackles. No accessory muscle use.


HEART: Regular rate and rhythm, S1 and S2 


ABDOMEN: Soft, not tender, not distended, normoactive bowel sounds, no guarding

, no rebound tenderness  


MUSCULOSKELETAL: Normal range of motion at all joints. No bony deformities or 

tenderness. No CVA tenderness.


RIGHT UPPER EXTREMITy: shoulder surgical dressing c/d/i, surgical wound not 

visualized; 2+ radial pulse, warm, well-perfused; sling


LOWER EXTREMITIES: 2+ pulses, warm, well-perfused. No calf tenderness. No 

peripheral edema. 


NEUROLOGICAL:  Cranial nerves II-XII intact. Normal speech. 





 Laboratory Results - last 24 hr











  03/20/19 03/20/19





  07:10 07:10


 


WBC  13.2 H 


 


RBC  3.36 L 


 


Hgb  9.3 L 


 


Hct  29.1 L 


 


MCV  86.6 


 


MCH  27.6 


 


MCHC  31.9 L 


 


RDW  13.2 


 


Plt Count  397 


 


MPV  9.6 


 


Absolute Neuts (auto)  10.1 


 


Neutrophils %  76.3 


 


Lymphocytes %  12.9 


 


Monocytes %  10.3 H 


 


Eosinophils %  0.2 


 


Basophils %  0.3 


 


Sodium   132 L


 


Potassium   4.0


 


Chloride   101


 


Carbon Dioxide   25


 


Anion Gap   6 L


 


BUN   13


 


Creatinine   0.6


 


Creat Clearance w eGFR   98.55


 


Random Glucose   125 H


 


Calcium   8.4 L


 


Magnesium   1.9


 


Total Bilirubin   0.6


 


AST   40 H


 


ALT   15


 


Alkaline Phosphatase   63


 


Total Protein   5.5 L


 


Albumin   2.9 L








                  Active Medications











Generic Name Dose Route Start Last Admin





  Trade Name Freq  PRN Reason Stop Dose Admin


 


Acetaminophen  650 mg  03/18/19 20:00  03/20/19 13:47





  Tylenol -  PO  03/21/19 19:59  650 mg





  Q6H JUSTIN   Administration





     





     





     





     


 


Al Hydroxide/Mg Hydroxide  30 ml  03/18/19 14:07  





  Mylanta Oral Suspension -  PO   





  Q6H PRN   





  INDIGESTION   





     





     





     


 


Aspirin  325 mg  03/19/19 10:00  03/20/19 10:36





  Ecotrin -  PO   325 mg





  DAILY JUSTIN   Administration





     





     





     





     


 


Docusate Sodium  100 mg  03/18/19 14:00  03/20/19 13:47





  Colace -  PO   100 mg





  TID JUSTIN   Administration





     





     





     





     


 


Sodium Chloride  1,000 mls @ 100 mls/hr  03/20/19 11:45  03/20/19 12:19





  Normal Saline -  IV   100 mls/hr





  ASDIR JUSTIN   Administration





     





     





     





     


 


Magnesium Hydroxide  30 ml  03/18/19 14:08  





  Milk Of Magnesia -  PO   





  DAILY PRN   





  CONSTIPATION   





     





     





     


 


Morphine Sulfate  4 mg  03/18/19 14:11  





  Morphine Sulfate  IVPUSH   





  Q6H PRN   





  PAIN LEVEL 6-10 BREAKTHRU PAIN   





     





     





     


 


Ondansetron HCl  4 mg  03/20/19 11:04  





  Zofran Injection  IVPUSH   





  Q6H PRN   





  NAUSEA AND/OR VOMITING   





     





     





     


 


Oxycodone HCl  10 mg  03/18/19 22:00  03/20/19 10:37





  Oxycontin -  PO   Not Given





  BID JUSTIN   





     





     





     





     


 


Oxycodone HCl  5 mg  03/18/19 13:00  





  Roxicodone -  PO  03/21/19 13:00  





  Q4H PRN   





  PAIN LEVEL 1-5   





     





     





     


 


Oxycodone HCl  10 mg  03/18/19 13:02  03/18/19 18:15





  Roxicodone -  PO  03/21/19 13:02  10 mg





  Q4H PRN   Administration





  PAIN LEVEL 6-10   





     





     





     


 


Pantoprazole Sodium  40 mg  03/19/19 16:15  03/20/19 10:36





  Protonix -  PO   40 mg





  DAILY JUSTIN   Administration





     





     





     





     


 


Pregabalin  75 mg  03/20/19 22:00  





  Lyrica -  PO   





  HS JUSTIN   





     





     





     





     


 


Ropinirole HCl  2 mg  03/19/19 22:00  03/19/19 21:48





  Requip -  PO   2 mg





  HS JUSTIN   Administration





     





     





     





     


 


Senna  2 tab  03/19/19 10:00  03/20/19 10:37





  Senna -  PO   2 tab





  DAILY JUSTIN   Administration





     





     





     





     


 


Venlafaxine HCl  37.5 mg  03/19/19 10:00  03/20/19 10:36





  Effexor -  PO   37.5 mg





  BID JUSTIN   Administration





     





     





     





     


























ASSESSMENT/PLAN


73 year-old female with a PMH significant for fibromyalgia, post-herpetic 

neuralgia, restless leg syndrome, GERD and degenerative joint disease s/p right 

reverse total shoulder replacement and bicep tendonesis on 3/18/19. 





Reverse total shoulder replacement and bicep tendonesis


                --POD #2


 --perioperative antibiotics complete (Vanc x 1, cefazolin x 2)


 --pain management per surgery


 --ASA 325mg daily


 --bowel regimen


 --incentive spirometry





Resless leg syndrome


   --continue ropirinole 





Fibromyalgia


Post-herpetic neuralgia


   --continue Lyrica, venlafaxine 





GERD


   --continue protonix





Low sodium


   --Na trending down slightly 


   --gentle IV fluids 





Leukocytosis


   --likely reactive from surgery, trending down


   


FEN


 Fluids: PO intake adequate


 Electrolytes: replete as indicated


 Nutrition: regular diet





DVT prophylaxis: OOB, ambulation, SCDs, ASA 325mg daily





Physical therapy





Dispo: We will continue to follow the patient. Thank you for this consultative 

opportunity.





Visit type





- Emergency Visit


Emergency Visit: No





- New Patient


This patient is new to me today: No





- Critical Care


Critical Care patient: No

## 2019-03-21 VITALS — DIASTOLIC BLOOD PRESSURE: 66 MMHG | SYSTOLIC BLOOD PRESSURE: 133 MMHG | TEMPERATURE: 98.6 F | HEART RATE: 86 BPM

## 2019-03-21 LAB
ANION GAP SERPL CALC-SCNC: 8 MMOL/L (ref 8–16)
BUN SERPL-MCNC: 11 MG/DL (ref 7–18)
CALCIUM SERPL-MCNC: 8.4 MG/DL (ref 8.5–10)
CHLORIDE SERPL-SCNC: 101 MMOL/L (ref 98–107)
CO2 SERPL-SCNC: 24 MMOL/L (ref 21–32)
CREAT SERPL-MCNC: 0.5 MG/DL (ref 0.55–1.3)
GLUCOSE SERPL-MCNC: 103 MG/DL (ref 74–106)
MAGNESIUM SERPL-MCNC: 2.2 MG/DL (ref 1.8–2.4)
POTASSIUM SERPLBLD-SCNC: 4 MMOL/L (ref 3.5–5.1)
SODIUM SERPL-SCNC: 133 MMOL/L (ref 136–145)

## 2019-03-21 RX ADMIN — DOCUSATE SODIUM SCH MG: 100 CAPSULE, LIQUID FILLED ORAL at 07:12

## 2019-03-21 RX ADMIN — ACETAMINOPHEN SCH: 325 TABLET ORAL at 08:58

## 2019-03-21 RX ADMIN — ACETAMINOPHEN SCH MG: 325 TABLET ORAL at 15:47

## 2019-03-21 RX ADMIN — VENLAFAXINE SCH MG: 37.5 TABLET ORAL at 10:15

## 2019-03-21 RX ADMIN — SENNOSIDES SCH TAB: 8.6 TABLET, FILM COATED ORAL at 10:15

## 2019-03-21 RX ADMIN — OXYCODONE HYDROCHLORIDE SCH: 10 TABLET, FILM COATED, EXTENDED RELEASE ORAL at 10:15

## 2019-03-21 RX ADMIN — ACETAMINOPHEN SCH: 325 TABLET ORAL at 04:15

## 2019-03-21 RX ADMIN — PANTOPRAZOLE SODIUM SCH MG: 40 TABLET, DELAYED RELEASE ORAL at 10:14

## 2019-03-21 RX ADMIN — SODIUM CHLORIDE SCH: 9 INJECTION, SOLUTION INTRAVENOUS at 15:47

## 2019-03-21 RX ADMIN — DOCUSATE SODIUM SCH: 100 CAPSULE, LIQUID FILLED ORAL at 15:47

## 2019-03-21 RX ADMIN — ASPIRIN SCH MG: 325 TABLET ORAL at 10:15

## 2019-03-21 NOTE — PN
Physical Exam: 


SUBJECTIVE: Patient seen and examined. Feels ready to go home.








OBJECTIVE:





 Vital Signs











 Period  Temp  Pulse  Resp  BP Sys/Banuelos  Pulse Ox


 


 Last 24 Hr  98.5 F-99.0 F  74-94  17-18  126-132/57-66  93-95











GENERAL: Awake, alert, and fully oriented, in no acute distress.


LUNGS: Anterior breath sounds equal, clear to auscultation bilaterally. No 

wheezes, and no crackles. No accessory muscle use.


HEART: Regular rate and rhythm, S1 and S2 


ABDOMEN: Soft, not tender, not distended, normoactive bowel sounds, no guarding

, no rebound tenderness  


MUSCULOSKELETAL: Normal range of motion at all joints. No bony deformities or 

tenderness. No CVA tenderness.


RIGHT UPPER EXTREMITy: shoulder surgical dressing c/d/i, surgical wound not 

visualized; 2+ radial pulse, warm, well-perfused, flex/extend fingers; arm in 

sling


LOWER EXTREMITIES: 2+ pulses, warm, well-perfused. No calf tenderness. No 

peripheral edema. 


NEUROLOGICAL:  Cranial nerves II-XII intact. Normal speech. 








 Laboratory Results - last 24 hr











  03/21/19





  07:06


 


Sodium  133 L


 


Potassium  4.0


 


Chloride  101


 


Carbon Dioxide  24


 


Anion Gap  8


 


BUN  11


 


Creatinine  0.5 L


 


Creat Clearance w eGFR  121.63


 


Random Glucose  103


 


Calcium  8.4 L


 


Magnesium  2.2








                           Active Medications











Generic Name Dose Route Start Last Admin





  Trade Name Freq  PRN Reason Stop Dose Admin


 


Acetaminophen  650 mg  03/18/19 20:00  03/21/19 08:58





  Tylenol -  PO  03/21/19 19:59  Not Given





  Q6H JUSTIN   





     





     





     





     


 


Al Hydroxide/Mg Hydroxide  30 ml  03/18/19 14:07  





  Mylanta Oral Suspension -  PO   





  Q6H PRN   





  INDIGESTION   





     





     





     


 


Aspirin  325 mg  03/19/19 10:00  03/21/19 10:15





  Ecotrin -  PO   325 mg





  DAILY JUSTIN   Administration





     





     





     





     


 


Docusate Sodium  100 mg  03/18/19 14:00  03/21/19 07:12





  Colace -  PO   100 mg





  TID JUSTIN   Administration





     





     





     





     


 


Sodium Chloride  1,000 mls @ 100 mls/hr  03/20/19 11:45  03/20/19 12:19





  Normal Saline -  IV   100 mls/hr





  ASDIR JUSTIN   Administration





     





     





     





     


 


Magnesium Hydroxide  30 ml  03/18/19 14:08  





  Milk Of Magnesia -  PO   





  DAILY PRN   





  CONSTIPATION   





     





     





     


 


Morphine Sulfate  4 mg  03/18/19 14:11  





  Morphine Sulfate  IVPUSH   





  Q6H PRN   





  PAIN LEVEL 6-10 BREAKTHRU PAIN   





     





     





     


 


Ondansetron HCl  4 mg  03/20/19 11:04  





  Zofran Injection  IVPUSH   





  Q6H PRN   





  NAUSEA AND/OR VOMITING   





     





     





     


 


Oxycodone HCl  10 mg  03/18/19 22:00  03/21/19 10:15





  Oxycontin -  PO   Not Given





  BID JUSTIN   





     





     





     





     


 


Oxycodone HCl  5 mg  03/18/19 13:00  





  Roxicodone -  PO  03/21/19 13:00  





  Q4H PRN   





  PAIN LEVEL 1-5   





     





     





     


 


Oxycodone HCl  10 mg  03/18/19 13:02  03/18/19 18:15





  Roxicodone -  PO  03/21/19 13:02  10 mg





  Q4H PRN   Administration





  PAIN LEVEL 6-10   





     





     





     


 


Pantoprazole Sodium  40 mg  03/19/19 16:15  03/21/19 10:14





  Protonix -  PO   40 mg





  DAILY JUSTIN   Administration





     





     





     





     


 


Pregabalin  75 mg  03/20/19 22:00  03/20/19 21:24





  Lyrica -  PO   75 mg





  HS JUSTIN   Administration





     





     





     





     


 


Ropinirole HCl  2 mg  03/19/19 22:00  03/20/19 21:25





  Requip -  PO   2 mg





  HS JUSTIN   Administration





     





     





     





     


 


Senna  2 tab  03/19/19 10:00  03/21/19 10:15





  Senna -  PO   2 tab





  DAILY JUSTIN   Administration





     





     





     





     


 


Venlafaxine HCl  37.5 mg  03/19/19 10:00  03/21/19 10:15





  Effexor -  PO   37.5 mg





  BID JUSTIN   Administration





     





     





     





     














ASSESSMENT/PLAN


73 year-old female with a PMH significant for fibromyalgia, post-herpetic 

neuralgia, restless leg syndrome, GERD and degenerative joint disease s/p right 

reverse total shoulder replacement and bicep tendonesis on 3/18/19. 





Reverse total shoulder replacement and bicep tendonesis


                --POD #3


 --perioperative antibiotics complete (Vanc x 1, cefazolin x 2)


 --pain management per surgery


 --ASA 325mg daily


 --bowel regimen


 --incentive spirometry





Resless leg syndrome


   --continue ropirinole 





Fibromyalgia


Post-herpetic neuralgia


   --continue Lyrica, venlafaxine 





GERD


   --continue protonix





Low sodium


   --Na stable


   





Leukocytosis


   --likely reactive from surgery, afebrile


   


FEN


 Fluids: PO intake adequate


 Electrolytes: replete as indicated


 Nutrition: regular diet





DVT prophylaxis: OOB, ambulation, SCDs, ASA 325mg daily





Physical therapy





Dispo: We will continue to follow the patient. Thank you for this consultative 

opportunity.








Visit type





- Emergency Visit


Emergency Visit: No





- New Patient


This patient is new to me today: No





- Critical Care


Critical Care patient: No





- Discharge Referral


Referred to [facility] No

## 2019-03-21 NOTE — PN
Progress Note (short form)





- Note


Progress Note: 





 ORTHOPEDIC SURGERY PROGRESS NOTE


                             Department of Orthopedic Surgery





SUBJECTIVE





No acute events overnight. No nausea or vomiting. Denies chest pain, shortness 

of breath, or calf pain. Tolerating oral intake. Pain controlled.  





PHYSICAL EXAMINATION


General: Alert, oriented, cooperative and no distress.





Upper Extremity: Dressing intact with no drainage; Dressing removed and skin 

assessed. Sutures intact. No drainage. Shoulder immobilizer; Compartments soft. 

M/R/U/MSK/AX motor intact; SILT distally; 2+ radial pulses; Cap refill brisk.





DVT Exam: No evidence of DVT seen on physical exam; No cords or calf tenderness

; No significant calf/ankle edema.








Intake & Output











 03/19/19 03/20/19 03/21/19





 23:59 23:59 23:59


 


Intake Total 1395 2570 


 


Output Total  600 


 


Balance 1395 1970 


 


Intake:   


 


  IV  1400 


 


    Normal Saline - 1,000 ml  1400 





    @ 100 mls/hr IV ASDIR JUSTIN   





    Rx#:[id number]   


 


  IVPB  250 


 


  Oral 1395 920 


 


Output:   


 


  Urine  600 


 


    Void  600 


 


Other:   


 


  Voiding Method Toilet Toilet Toilet


 


  # Unmeasured Voids   


 


    Void 1 100 


 


  Bowel Movement  No 








Active Medications











Generic Name Dose Route Start Last Admin





  Trade Name Freq  PRN Reason Stop Dose Admin


 


Acetaminophen  650 mg  03/18/19 20:00  03/21/19 04:15





  Tylenol -  PO  03/21/19 19:59  Not Given





  Q6H JUSTIN   





     





     





     





     


 


Al Hydroxide/Mg Hydroxide  30 ml  03/18/19 14:07  





  Mylanta Oral Suspension -  PO   





  Q6H PRN   





  INDIGESTION   





     





     





     


 


Aspirin  325 mg  03/19/19 10:00  03/20/19 10:36





  Ecotrin -  PO   325 mg





  DAILY JUSTIN   Administration





     





     





     





     


 


Docusate Sodium  100 mg  03/18/19 14:00  03/21/19 07:12





  Colace -  PO   100 mg





  TID JUSTIN   Administration





     





     





     





     


 


Sodium Chloride  1,000 mls @ 100 mls/hr  03/20/19 11:45  03/20/19 12:19





  Normal Saline -  IV   100 mls/hr





  ASDIR JUSTIN   Administration





     





     





     





     


 


Magnesium Hydroxide  30 ml  03/18/19 14:08  





  Milk Of Magnesia -  PO   





  DAILY PRN   





  CONSTIPATION   





     





     





     


 


Morphine Sulfate  4 mg  03/18/19 14:11  





  Morphine Sulfate  IVPUSH   





  Q6H PRN   





  PAIN LEVEL 6-10 BREAKTHRU PAIN   





     





     





     


 


Ondansetron HCl  4 mg  03/20/19 11:04  





  Zofran Injection  IVPUSH   





  Q6H PRN   





  NAUSEA AND/OR VOMITING   





     





     





     


 


Oxycodone HCl  10 mg  03/18/19 22:00  03/20/19 21:24





  Oxycontin -  PO   10 mg





  BID JUSTIN   Administration





     





     





     





     


 


Oxycodone HCl  5 mg  03/18/19 13:00  





  Roxicodone -  PO  03/21/19 13:00  





  Q4H PRN   





  PAIN LEVEL 1-5   





     





     





     


 


Oxycodone HCl  10 mg  03/18/19 13:02  03/18/19 18:15





  Roxicodone -  PO  03/21/19 13:02  10 mg





  Q4H PRN   Administration





  PAIN LEVEL 6-10   





     





     





     


 


Pantoprazole Sodium  40 mg  03/19/19 16:15  03/20/19 10:36





  Protonix -  PO   40 mg





  DAILY JUSTIN   Administration





     





     





     





     


 


Pregabalin  75 mg  03/20/19 22:00  03/20/19 21:24





  Lyrica -  PO   75 mg





  HS JUSTIN   Administration





     





     





     





     


 


Ropinirole HCl  2 mg  03/19/19 22:00  03/20/19 21:25





  Requip -  PO   2 mg





  HS JUSTIN   Administration





     





     





     





     


 


Senna  2 tab  03/19/19 10:00  03/20/19 10:37





  Senna -  PO   2 tab





  DAILY JUSTIN   Administration





     





     





     





     


 


Venlafaxine HCl  37.5 mg  03/19/19 10:00  03/20/19 21:24





  Effexor -  PO   37.5 mg





  BID JUSTIN   Administration





     





     





     





     








Vital Signs (last)











Temp Pulse Resp BP Pulse Ox


 


 99.0 F   82   18   131/63   94 L


 


 03/20/19 22:40  03/20/19 22:40  03/20/19 22:40  03/20/19 22:40  03/21/19 05:56








Laboratory





 03/20/19 07:10 














ASSESSMENT AND PLAN





Nba Zavala is a 73 year old female status post right reverse total shoulder 

arthroplasty. POD #3





Doing well postoperatively, progressing as expected.





- FU AM Labs


- Appreciate medicine management


- Pain control: minimize narcotic use


- DVT prophylaxis; SCDs; Aspirin 325 mg daily x 3 weeks


- Ice


- Encourage incentive spirometry


- Elevate HOB, encourage oral intake


- PT/OT


- Dispo planning. Discharge home when okay with medicine; Return to the office 

in 10 days.

## 2019-11-01 ENCOUNTER — HOSPITAL ENCOUNTER (INPATIENT)
Dept: HOSPITAL 74 - FM/S | Age: 74
LOS: 4 days | Discharge: HOME | DRG: 302 | End: 2019-11-05
Attending: INTERNAL MEDICINE | Admitting: ORTHOPAEDIC SURGERY
Payer: COMMERCIAL

## 2019-11-01 VITALS — BODY MASS INDEX: 30.8 KG/M2

## 2019-11-01 DIAGNOSIS — M17.11: Primary | ICD-10-CM

## 2019-11-01 DIAGNOSIS — R11.0: ICD-10-CM

## 2019-11-01 DIAGNOSIS — G62.9: ICD-10-CM

## 2019-11-01 DIAGNOSIS — M79.7: ICD-10-CM

## 2019-11-01 LAB
ANION GAP SERPL CALC-SCNC: 8 MMOL/L (ref 8–16)
BUN SERPL-MCNC: 14 MG/DL (ref 7–18)
CALCIUM SERPL-MCNC: 8.6 MG/DL (ref 8.5–10)
CHLORIDE SERPL-SCNC: 106 MMOL/L (ref 98–107)
CO2 SERPL-SCNC: 24 MMOL/L (ref 21–32)
CREAT SERPL-MCNC: 0.7 MG/DL (ref 0.55–1.3)
DEPRECATED RDW RBC AUTO: 14.6 % (ref 11.6–15.6)
GLUCOSE SERPL-MCNC: 142 MG/DL (ref 74–106)
HCT VFR BLD CALC: 32.8 % (ref 32.4–45.2)
HGB BLD-MCNC: 11 GM/DL (ref 10.7–15.3)
MCH RBC QN AUTO: 28.1 PG (ref 25.7–33.7)
MCHC RBC AUTO-ENTMCNC: 33.4 G/DL (ref 32–36)
MCV RBC: 84.1 FL (ref 80–96)
PLATELET # BLD AUTO: 392 K/MM3 (ref 134–434)
PLATELET BLD QL SMEAR: ADEQUATE
PMV BLD: 9.2 FL (ref 7.5–11.1)
POTASSIUM SERPLBLD-SCNC: 4.1 MMOL/L (ref 3.5–5.1)
RBC # BLD AUTO: 3.91 M/MM3 (ref 3.6–5.2)
SODIUM SERPL-SCNC: 138 MMOL/L (ref 136–145)
WBC # BLD AUTO: 10.2 K/MM3 (ref 4–10.8)

## 2019-11-01 PROCEDURE — 0SRC0J9 REPLACEMENT OF RIGHT KNEE JOINT WITH SYNTHETIC SUBSTITUTE, CEMENTED, OPEN APPROACH: ICD-10-PCS | Performed by: ORTHOPAEDIC SURGERY

## 2019-11-01 RX ADMIN — ONDANSETRON PRN MG: 2 INJECTION INTRAMUSCULAR; INTRAVENOUS at 12:41

## 2019-11-01 RX ADMIN — ACETAMINOPHEN SCH MG: 325 TABLET ORAL at 15:30

## 2019-11-01 RX ADMIN — PREGABALIN SCH MG: 50 CAPSULE ORAL at 21:13

## 2019-11-01 RX ADMIN — ACETAMINOPHEN SCH MG: 325 TABLET ORAL at 21:16

## 2019-11-01 RX ADMIN — VENLAFAXINE HYDROCHLORIDE SCH MG: 37.5 CAPSULE, EXTENDED RELEASE ORAL at 21:14

## 2019-11-01 RX ADMIN — CEFAZOLIN SODIUM SCH MLS/HR: 2 SOLUTION INTRAVENOUS at 18:00

## 2019-11-01 RX ADMIN — DOCUSATE SODIUM,SENNOSIDES SCH TABLET: 50; 8.6 TABLET, FILM COATED ORAL at 21:14

## 2019-11-01 RX ADMIN — ROPINIROLE SCH MG: 1 TABLET, FILM COATED ORAL at 21:14

## 2019-11-01 NOTE — HP
History & Physical Update





- History


History: No Change





- Physical


Physical: No Change





- Assessment


Assessment: No Change





- Plan


Plan: No Change (H&P from Dr. Lang 10/17/19)

## 2019-11-01 NOTE — EKG
Test Reason : 

Blood Pressure : ***/*** mmHG

Vent. Rate : 067 BPM     Atrial Rate : 067 BPM

   P-R Int : 166 ms          QRS Dur : 090 ms

    QT Int : 400 ms       P-R-T Axes : 041 034 010 degrees

   QTc Int : 422 ms

 

NORMAL SINUS RHYTHM

NONSPECIFIC T WAVE ABNORMALITY

ABNORMAL ECG

WHEN COMPARED WITH ECG OF 09-DEC-2012 19:10,

NO SIGNIFICANT CHANGE WAS FOUND

Confirmed by RANDEE FIGUEROA MD ([id number]) on 11/1/2019 2:10:56 PM

 

Referred By: Dileep Dyson           Confirmed By:RANDEE FIGUEROA MD

## 2019-11-01 NOTE — OPR
DATE OF OPERATION: 11/1/19





TITLE OF OPERATION: Right Total Knee Replacement





PREOPERATIVE DIAGNOSIS: Right knee osteoarthritis





POSTOPERATIVE DIAGNOSIS: Right knee osteoarthritis





SURGEON: Dileep Dyson DO





ASSISTANT: Grover Vazquez DO





ANESTHESIA: Spinal anesthesia with regional block





SPECIMEN: bone cuts





PROSTHETIC DEVICE/IMPLANT: Triathlon X3 Symmetric patella size 29mm, 8mm 

thickness; Triathlon Posterior Stabilized Femur Size 3; Triathlon Tibial 

Baseplate Size 2; Triathlon Tibial Bearing insert - PS - size 2, 11mm thickness.





COMPLICATIONS: none





EBL: 100 mL





TOURNIQUET TIME: 125 mins








INDICATIONS FOR SURGERY:


Ms. Zavala is a 74 year old female who presented in the preoperative setting with 

a chief complaint of severe right knee osteoarthritis with knee pain; After 

significant time spent on non-operative management including cortisone 

injections, bracing, physical therapy and activity modification, surgical 

treatment was discussed with the patient. The risks and benefits of surgery and 

anesthesia were discussed in detail including but not limited to infection, 

continued pain, bleeding, blood clot, scarring/arthrofibrosis, damage to 

vessels and nerves, instability or loosening of implant, difficulty ambulating, 

aries-prosthetic fracture, failure to obtain the desired result, failure to heal

, failure to return to significant activity. Understanding the risks and 

benefits, Ms. Zavala and her family opted to proceed with surgical management.





EXAMINATION UNDER ANESTHESIA:





Right knee: 0-120 degrees of flexion; No laxity in varus / valgus stress at 0/

30 degrees.





SURGEON'S NARRATIVE:





After informed consent was obtained patient was brought to the operating room 

prepped and draped in usual fashion with strict sterile technique.  A timeout 

was called site verification was performed and perioperative antibiotics were 

administered.  The limb was exsanguinated with an Esmarch and the tourniquet 

was elevated to 300 mm of mercury for approximately 2 hours.  A 10 cm 

longitudinal incision was made over the anterior aspect of the knee.  A median 

parapatellar approach was used to enter the knee joint.  The patella was 

everted in a standard fashion.  The knee was then hyperflexed exposing complete 

cartilage loss over 60% of the medial aspect of the tibia, and approximately 40

% of the femoral condyles.  There were a moderate amount of osteophytes which 

were removed. The patella fat pad was removed, being extra cautious as to not 

avulse the patellar tendon insertion.  I then sharply dissected capsular soft 

tissues connecting the meniscus medially to the tibial plateau wrapping all the 

way around to the posterior aspect of the plateau, detaching the deep MCL and 

posterior capsule. I then inserted the Makoplasty femoral and tibial pins using 

the pin-guide, and attached the sensor arrays. I used the makpolasty sensors to 

plan the femoral and tibial cuts. I made a standard distal and chamfer cuts cut 

on the femur in the standard fashion using the makoplasty robot.  I then cut 

the tibia perpendicular to the long axis of the tibia. An 11 mm spacer block 

was used to assess the flexion extension gaps which were well-balanced in both 

flexion and extension and had adequate space. This was double checked using the 

angella robot sensors. I then used the box-cutting femoral guide to cut the box 

into the femur. I sized the tibia as a size 2, and prepared the tibia in a 

standard fashion.  The trial size 2 was left in place using the drop ez and 

makoplasty sensors to ensure correct alignment, and a size 3 femur was placed 

appropriately in the medial lateral plane and then lug holes were drilled 

through the trial femur.  I then placed an 11 mm spacer block which seemed to 

be best balanced in both flexion and extension.  It allowed for full range of 

motion without overstuffing the joint.  I then cut the patella using a freehand 

technique to a thickness of 14 mm.  A size 29 trial was felt to be the best 

fit.  I drilled 3 drill holes using the 29 mm guide.  I slightly medialized the 

patella implant.  A trial 29 mm x 8 mm thickness patella was then placed in the 

knee taken through a range of motion was excellent patella tracking.  I removed 

all trial implants.  I then pulse lavaged irrigated and suctioned dry the cut 

surfaces of the knee.  I then cemented a size 2 tibial tray, a size 3 posterior 

stabilized femur and then placed a size 11 mm polyethylene trial implant 

holding the and full extension until the cement dried.  I then cemented in the 

patella with a 29 mm implant.  Once all the cement had hardened I then 

irrigated the knee thoroughly. After the cement dried, I then suction irrigated 

out the knee and placed the final 11 mm polyethylene spacer.  I then injected a 

local anesthetic cockatail consisting of 40cc's of saline, 40cc's of .5% 

Marcaine, and 1cc of epinephrine into the surrounding tissues and capsule. Once 

this was done, the tourniquet was let down and all bleeders were cauterized. 

Tranexamic acid 1gm was given once the tourniquet was let down. The 

parapatellar incision was closed with #1 Vicryl suture in a simple stitch 

fashion, and the subcuticular skin was closed with 0 Vicryl, 2-0 Vicryl suture, 

and the skin with staples. The tibial and femoral pins were removed and the 

incisions were irrigated thorougly, then closed with 2-0 Vicryl suture and 

dermabond. A sterile Aquacell dressing was placed and the patient was placed in 

IRENE stockings and bilateral SCD's. She tolerated procedure well and arrived in 

recovery in stable condition.





POST-OP PLAN:





Pain Control


DVT Prophylaxis (ASA 325MG BID x 6 weeks; SCDs while in bed, encourage early 

ambulation)


WBAT/Physical Therapy daily


D/C Staples POD 14


F/U Radiographs of the Right Knee


F/U Daily AM Labs


Will see her in my office on POD 14 for a post-operative visit.

## 2019-11-02 LAB
ANION GAP SERPL CALC-SCNC: 11 MMOL/L (ref 8–16)
BUN SERPL-MCNC: 11 MG/DL (ref 7–18)
CALCIUM SERPL-MCNC: 8.7 MG/DL (ref 8.5–10)
CHLORIDE SERPL-SCNC: 97 MMOL/L (ref 98–107)
CO2 SERPL-SCNC: 26 MMOL/L (ref 21–32)
CREAT SERPL-MCNC: 0.6 MG/DL (ref 0.55–1.3)
DEPRECATED RDW RBC AUTO: 14.6 % (ref 11.6–15.6)
GLUCOSE SERPL-MCNC: 151 MG/DL (ref 74–106)
HCT VFR BLD CALC: 31.6 % (ref 32.4–45.2)
HGB BLD-MCNC: 10.6 GM/DL (ref 10.7–15.3)
MCH RBC QN AUTO: 27.9 PG (ref 25.7–33.7)
MCHC RBC AUTO-ENTMCNC: 33.5 G/DL (ref 32–36)
MCV RBC: 83.4 FL (ref 80–96)
PLATELET # BLD AUTO: 343 K/MM3 (ref 134–434)
PMV BLD: 9.5 FL (ref 7.5–11.1)
POTASSIUM SERPLBLD-SCNC: 4.1 MMOL/L (ref 3.5–5.1)
RBC # BLD AUTO: 3.79 M/MM3 (ref 3.6–5.2)
SODIUM SERPL-SCNC: 134 MMOL/L (ref 136–145)
WBC # BLD AUTO: 11.1 K/MM3 (ref 4–10.8)

## 2019-11-02 RX ADMIN — ASPIRIN 325 MG ORAL TABLET SCH MG: 325 PILL ORAL at 22:31

## 2019-11-02 RX ADMIN — PANTOPRAZOLE SODIUM SCH MG: 40 TABLET, DELAYED RELEASE ORAL at 11:00

## 2019-11-02 RX ADMIN — ASPIRIN 325 MG ORAL TABLET SCH MG: 325 PILL ORAL at 11:00

## 2019-11-02 RX ADMIN — PREGABALIN SCH MG: 50 CAPSULE ORAL at 22:31

## 2019-11-02 RX ADMIN — ACETAMINOPHEN SCH MG: 325 TABLET ORAL at 09:00

## 2019-11-02 RX ADMIN — CALCIUM SCH MG: 500 TABLET ORAL at 11:00

## 2019-11-02 RX ADMIN — MULTIVITAMIN TABLET SCH TAB: TABLET at 11:00

## 2019-11-02 RX ADMIN — ACETAMINOPHEN SCH MG: 325 TABLET ORAL at 02:47

## 2019-11-02 RX ADMIN — DOCUSATE SODIUM,SENNOSIDES SCH TABLET: 50; 8.6 TABLET, FILM COATED ORAL at 11:00

## 2019-11-02 RX ADMIN — PREGABALIN SCH MG: 50 CAPSULE ORAL at 11:00

## 2019-11-02 RX ADMIN — VENLAFAXINE HYDROCHLORIDE SCH MG: 37.5 CAPSULE, EXTENDED RELEASE ORAL at 11:00

## 2019-11-02 RX ADMIN — DOCUSATE SODIUM,SENNOSIDES SCH TABLET: 50; 8.6 TABLET, FILM COATED ORAL at 22:30

## 2019-11-02 RX ADMIN — VENLAFAXINE HYDROCHLORIDE SCH MG: 37.5 CAPSULE, EXTENDED RELEASE ORAL at 22:31

## 2019-11-02 RX ADMIN — ROPINIROLE SCH MG: 1 TABLET, FILM COATED ORAL at 22:31

## 2019-11-02 RX ADMIN — ACETAMINOPHEN SCH MG: 325 TABLET ORAL at 15:18

## 2019-11-02 RX ADMIN — ACETAMINOPHEN SCH MG: 325 TABLET ORAL at 22:31

## 2019-11-02 RX ADMIN — CEFAZOLIN SODIUM SCH MLS/HR: 2 SOLUTION INTRAVENOUS at 02:49

## 2019-11-02 NOTE — CONSULT
Consult


Consult Specialty:: INTERNAL MEDICINE


Referred by:: DR Dyson


Reason for Consultation:: PAIN, NEUROPATHY





- History of Present Illness


Chief Complaint: s/p elective right total knee replacement


History of Present Illness: 





74 yrs old female underwent right total knee replacement on 11/1/19





Has pain in right knee, right leg -- medicated with Morphine-- makes her drowsy 

but helps alleviate the pain 


No SOB, cough








- History Source


History Provided By: Patient


Limitations to Obtaining History: No Limitations





- Past Medical History


CNS: Yes: Peripheral Neuropathy, Other (restless legs syndrome)


...Pregnant: No


Rheumatology: Yes: Fibromyalgia





- Past Surgical History


Past Surgical History: Yes: Joint Replacement (right shoulder in March 2019 )





- Alcohol/Substance Use


Hx Alcohol Use: No





- Smoking History


Smoking history: Never smoked


Have you smoked in the past 12 months: No


Aproximately how many cigarettes per day: 0





Home Medications





- Allergies


Allergies/Adverse Reactions: 


 Allergies











Allergy/AdvReac Type Severity Reaction Status Date / Time


 


No Known Allergies Allergy   Verified 03/15/19 08:56














- Home Medications


Home Medications: 


Ambulatory Orders





Acetaminophen [Tylenol] 650 mg PO BID PRN 03/18/19 


Calcium Carbonate [Calcium] 500 mg PO DAILY 10/28/19 


Multivitamins [Tab-A-Vit -] 1 tab PO DAILY 10/28/19 


Oxycodone HCl 5 mg PO ASDIR PRN 10/28/19 


Pregabalin [Lyrica -] 150 mg PO BID 10/28/19 


Ranitidine HCl 150 mg PO BID PRN 10/28/19 


Ropinirole HCl 2 mg PO HS 10/28/19 


Venlafaxine HCl ER [Effexor Xr -] 37.5 mg PO BID 10/28/19 











Review of Systems





- Review of Systems


Constitutional: denies: Chills, Fever


Neck: reports: No Symptoms


Cardiovascular: reports: No Symptoms


Respiratory: reports: No Symptoms


Gastrointestinal: reports: No Symptoms


Genitourinary: reports: No Symptoms


Musculoskeletal: reports: Joint Pain, Muscle Pain


Integumentary: reports: No Symptoms





Physical Exam


Vital Signs: 


 Vital Signs











Temperature  98.7 F   11/02/19 10:00


 


Pulse Rate  82   11/02/19 10:00


 


Respiratory Rate  18   11/02/19 10:00


 


Blood Pressure  129/59 L  11/02/19 10:00


 


O2 Sat by Pulse Oximetry (%)  96   11/02/19 10:00











Constitutional: Yes: No Distress, Calm


Cardiovascular: Yes: Regular Rate and Rhythm


Respiratory: Yes: CTA Bilaterally


Gastrointestinal: Yes: Normal Bowel Sounds, Soft, Abdomen, Obese.  No: 

Tenderness


Edema: Yes


Edema: RLE: 1+


Neurological: Yes: Alert, Oriented


Psychiatric: Yes: Alert, Oriented


Labs: 


 CBC, BMP





 11/02/19 08:15 





 11/02/19 08:15 











Imaging





- Results


X-ray: Report Reviewed


EKG: Image Reviewed





Problem List





- Problems


(1) Osteoarthritis of right knee


Code(s): M17.11 - UNILATERAL PRIMARY OSTEOARTHRITIS, RIGHT KNEE   





(2) Knee joint replacement status


Code(s): Z96.659 - PRESENCE OF UNSPECIFIED ARTIFICIAL KNEE JOINT   





(3) Fibromyalgia


Code(s): M79.7 - FIBROMYALGIA   





(4) Neuropathy


Code(s): G62.9 - POLYNEUROPATHY, UNSPECIFIED   





Assessment/Plan





PLAN


ASA 325mg BID for DVT prophylaxis


Pain control adequate for now


Incentive spirometry


PT eval done today 


continue with meds


check labs in AM 


off iv fluids-- encourage po fluid intake 


May need STR on discharge

## 2019-11-02 NOTE — PN
Progress Note (short form)





- Note


Progress Note: 





 ORTHOPEDIC SURGERY PROGRESS NOTE


                 Department of Orthopedic Surgery





SUBJECTIVE





No acute events overnight. No complaints currently. Denies chest pain, 

shortness of breath, or calf pain. No nausea or vomiting. Tolerating oral 

intake. Pain control difficult overnight, but improving. 





PHYSICAL EXAMINATION


General: Alert, oriented, cooperative and no distress.





Lower Extremity: Dressing clean/dry/intact; No discharge or signs of infection. 

Skin intact, no lesions, rashes or ulcers noted. Muscle mass equal and 

symmetric to contralateral side. No atrophy noted. No masses or effusions 

noted. No tenderness to palpation. LROM of the knee secondary to pain and 

swelling post-operatively. Full passive and active ROM of the ankle and foot, 

free from pain. No signs of compartment syndrome; compartments of the leg soft 

and compressible; EHL/TA/GS motor intact; SILT distally; 2+ DP pulses; Cap 

refill brisk.





DVT Exam: No evidence of DVT seen on physical exam; No cords or calf tenderness

; No significant calf/ankle edema.





Intake & Output











 10/31/19 11/01/19 11/02/19





 23:59 23:59 23:59


 


Intake Total  1350 300


 


Output Total  600 


 


Balance  750 300


 


Intake:   


 


  IV  1350 


 


  Oral   300


 


Output:   


 


  Urine  600 


 


    Colon  600 


 


Other:   


 


  Voiding Method  Bedpan Bedpan


 


  # Unmeasured Voids   


 


    Colon   1


 


    Void   1


 


  Weight  157 lb 11.2 oz 


 


  Height  5 ft 


 


  Body Mass Index (BMI)  30.8 


 


  Weight Measurement Method  Standing Scale 








Active Medications











Generic Name Dose Route Start Last Admin





  Trade Name Freq  PRN Reason Stop Dose Admin


 


Acetaminophen  650 mg  11/01/19 15:00  11/02/19 02:47





  Tylenol -  PO  11/04/19 14:59  650 mg





  Q6H JUSTIN   Administration





     





     





     





     


 


Al Hydroxide/Mg Hydroxide  30 ml  11/01/19 12:44  





  Mylanta Oral Suspension -  PO   





  Q4H PRN   





  DYSPEPSIA   





     





     





     


 


Aspirin  325 mg  11/02/19 10:00  





  Asa -  PO   





  BID JUSTIN   





     





     





     





     


 


Calcium Carbonate  500 mg  11/02/19 10:00  





  Os-Nir 500mg -  PO   





  DAILY JUSTIN   





     





     





     





     


 


Fentanyl  50 mcg  11/01/19 12:39  11/01/19 12:50





  Sublimaze Injection -  IVPUSH   50 mcg





  [id number] PRN   Administration





  PAIN-PACU ORDER X 4 DOSES ONLY   





     





     





     


 


Morphine Sulfate  4 mg  11/01/19 12:56  





  Morphine Injection -  IVPUSH   





  Q4H PRN   





  PAIN LEVEL 7 - 10   





     





     





     


 


Multivitamins/Minerals/Vitamin C  1 tab  11/02/19 10:00  





  Tab-A-Vit -  PO   





  DAILY JUSTIN   





     





     





     





     


 


Ondansetron HCl  4 mg  11/01/19 12:44  11/01/19 12:41





  Zofran Injection  IVPUSH   4 mg





  Q6H PRN   Administration





  NAUSEA   





     





     





     


 


Oxycodone HCl  5 mg  11/01/19 12:54  





  Roxicodone -  PO  11/04/19 12:55  





  Q4H PRN   





  PAIN LEVEL 1-3   





     





     





     


 


Oxycodone HCl  10 mg  11/01/19 12:56  11/02/19 02:48





  Roxicodone -  PO  11/04/19 12:56  10 mg





  Q4H PRN   Administration





  PAIN LEVEL 4 - 6   





     





     





     


 


Pantoprazole Sodium  40 mg  11/02/19 10:00  





  Protonix -  PO   





  DAILY [facility]   





     





     





     





     


 


Pregabalin  150 mg  11/01/19 22:00  11/01/19 21:13





  Lyrica -  PO   150 mg





  BID JUSTIN   Administration





     





     





     





     


 


Promethazine HCl  12.5 mg  11/01/19 12:39  





  Phenergan Injection -  IVPUSH   





  Q6H PRN   





  NAUSEA-FOR RESCUE AFTER 15 MIN   





     





     





     


 


Ropinirole HCl  2 mg  11/01/19 22:00  11/01/19 21:14





  Requip -  PO   2 mg





  HS JUSTIN   Administration





     





     





     





     


 


Senna/Docusate Sodium  2 tablet  11/01/19 22:00  11/01/19 21:14





  Pericolace -  PO   2 tablet





  BID JUSTIN   Administration





     





     





     





     


 


Tramadol HCl  50 mg  11/01/19 12:39  11/01/19 21:14





  Ultram -  PO   50 mg





  Q3H PRN   Administration





  PAIN LEVEL 4 - 6   





     





     





     


 


Venlafaxine HCl  37.5 mg  11/01/19 22:00  11/01/19 21:14





  Effexor Xr -  PO   37.5 mg





  BID JUSTIN   Administration





     





     





     





     








Vital Signs (last)











Temp Pulse Resp BP Pulse Ox


 


 98.9 F   85   19   117/69   95 


 


 11/02/19 06:00  11/02/19 06:00  11/02/19 06:00  11/02/19 06:00  11/02/19 06:00








Laboratory





 11/02/19 08:15 





 11/02/19 08:15 














ASSESSMENT AND PLAN





Ms. Zavala is a 74 year old female s/p right total knee arthroplasty, POD 1





- Pain control: Transition to oral pain medications, minimize narcotic use


- DVT prophylaxis -  BID x 6 weeks; SCD's while in bed


- Ice/Elevation of right knee


- F/U AM Labs


- Elevate HOB, encourage oral intake


- Appreciate medical management (Nutrition optimization, incentive spirometer, 

decubitus precautions heel/sacrum)


- PT twice daily; WBAT RLE 


- D/C Staples POD 14


- Change dressing POD 5, and Q3days thereafter


- Dispo planning

## 2019-11-03 LAB
ALBUMIN SERPL-MCNC: 2.9 G/DL (ref 3.4–5)
ALP SERPL-CCNC: 86 U/L (ref 45–117)
ALT SERPL-CCNC: 12 U/L (ref 13–61)
ANION GAP SERPL CALC-SCNC: 12 MMOL/L (ref 8–16)
AST SERPL-CCNC: 22 U/L (ref 15–37)
BASOPHILS # BLD: 0.3 % (ref 0–2)
BILIRUB SERPL-MCNC: 0.5 MG/DL (ref 0.2–1)
BUN SERPL-MCNC: 11 MG/DL (ref 7–18)
CALCIUM SERPL-MCNC: 8.7 MG/DL (ref 8.5–10)
CHLORIDE SERPL-SCNC: 97 MMOL/L (ref 98–107)
CO2 SERPL-SCNC: 23 MMOL/L (ref 21–32)
CREAT SERPL-MCNC: 0.6 MG/DL (ref 0.55–1.3)
DEPRECATED RDW RBC AUTO: 14.1 % (ref 11.6–15.6)
EOSINOPHIL # BLD: 0.3 % (ref 0–4.5)
GLUCOSE SERPL-MCNC: 120 MG/DL (ref 74–106)
HCT VFR BLD CALC: 30.5 % (ref 32.4–45.2)
HGB BLD-MCNC: 10.2 GM/DL (ref 10.7–15.3)
LYMPHOCYTES # BLD: 11.6 % (ref 8–40)
MCH RBC QN AUTO: 27.9 PG (ref 25.7–33.7)
MCHC RBC AUTO-ENTMCNC: 33.5 G/DL (ref 32–36)
MCV RBC: 83.3 FL (ref 80–96)
MONOCYTES # BLD AUTO: 12.8 % (ref 3.8–10.2)
NEUTROPHILS # BLD: 75 % (ref 42.8–82.8)
PLATELET # BLD AUTO: 371 K/MM3 (ref 134–434)
PMV BLD: 10.3 FL (ref 7.5–11.1)
POTASSIUM SERPLBLD-SCNC: 4.3 MMOL/L (ref 3.5–5.1)
PROT SERPL-MCNC: 5.7 G/DL (ref 6.4–8.2)
RBC # BLD AUTO: 3.66 M/MM3 (ref 3.6–5.2)
SODIUM SERPL-SCNC: 132 MMOL/L (ref 136–145)
WBC # BLD AUTO: 14 K/MM3 (ref 4–10.8)

## 2019-11-03 RX ADMIN — DOCUSATE SODIUM,SENNOSIDES SCH TABLET: 50; 8.6 TABLET, FILM COATED ORAL at 09:32

## 2019-11-03 RX ADMIN — ACETAMINOPHEN SCH MG: 325 TABLET ORAL at 15:15

## 2019-11-03 RX ADMIN — ASPIRIN 325 MG ORAL TABLET SCH MG: 325 PILL ORAL at 09:31

## 2019-11-03 RX ADMIN — ASPIRIN 325 MG ORAL TABLET SCH MG: 325 PILL ORAL at 22:25

## 2019-11-03 RX ADMIN — ACETAMINOPHEN SCH MG: 325 TABLET ORAL at 08:10

## 2019-11-03 RX ADMIN — ROPINIROLE SCH MG: 1 TABLET, FILM COATED ORAL at 22:25

## 2019-11-03 RX ADMIN — PANTOPRAZOLE SODIUM SCH MG: 40 TABLET, DELAYED RELEASE ORAL at 09:32

## 2019-11-03 RX ADMIN — VENLAFAXINE HYDROCHLORIDE SCH MG: 37.5 CAPSULE, EXTENDED RELEASE ORAL at 09:32

## 2019-11-03 RX ADMIN — PREGABALIN SCH MG: 50 CAPSULE ORAL at 09:32

## 2019-11-03 RX ADMIN — ACETAMINOPHEN SCH MG: 325 TABLET ORAL at 22:25

## 2019-11-03 RX ADMIN — CALCIUM SCH MG: 500 TABLET ORAL at 09:32

## 2019-11-03 RX ADMIN — PREGABALIN SCH MG: 50 CAPSULE ORAL at 22:27

## 2019-11-03 RX ADMIN — DOCUSATE SODIUM,SENNOSIDES SCH TABLET: 50; 8.6 TABLET, FILM COATED ORAL at 22:26

## 2019-11-03 RX ADMIN — ACETAMINOPHEN SCH MG: 325 TABLET ORAL at 02:33

## 2019-11-03 RX ADMIN — MULTIVITAMIN TABLET SCH TAB: TABLET at 09:32

## 2019-11-03 RX ADMIN — Medication SCH ML: at 17:10

## 2019-11-03 RX ADMIN — Medication SCH APPLIC: at 14:29

## 2019-11-03 NOTE — PN
Progress Note (short form)





- Note


Progress Note: 





 ORTHOPEDIC SURGERY PROGRESS NOTE


                 Department of Orthopedic Surgery





SUBJECTIVE





No acute events overnight. No complaints currently. Sitting comfortably in 

chair. Was able to ambulate with PT today. Denies chest pain, shortness of 

breath, or calf pain. No nausea or vomiting. Tolerating oral intake. Pain 

control improved. 





PHYSICAL EXAMINATION


General: Alert, oriented, cooperative and no distress.





Lower Extremity: Dressing clean/dry/intact; No discharge or signs of infection. 

Skin intact, no lesions, rashes or ulcers noted. Muscle mass equal and 

symmetric to contralateral side. No atrophy noted. No masses or effusions 

noted. No tenderness to palpation. Inreased ROM, of the knee secondary to pain 

and swelling post-operatively. Full passive and active ROM of the ankle and foot

, free from pain. No signs of compartment syndrome; compartments of the leg 

soft and compressible; EHL/TA/GS motor intact; SILT distally; 2+ DP pulses; Cap 

refill brisk.





DVT Exam: No evidence of DVT seen on physical exam; No cords or calf tenderness

; No significant calf/ankle edema.





Intake & Output











 11/01/19 11/02/19 11/03/19





 23:59 23:59 22:59


 


Intake Total 1350 800 450


 


Output Total 600  


 


Balance 750 800 450


 


Intake:   


 


  IV 1350  


 


  Oral  800 450


 


Output:   


 


  Urine 600  


 


    Colon 600  


 


Other:   


 


  Voiding Method Bedpan Toilet Toilet


 


  # Unmeasured Voids   


 


    Colon  1 


 


    Void  2 


 


  Weight 157 lb 11.2 oz  


 


  Height 5 ft  


 


  Body Mass Index (BMI) 30.8  


 


  Weight Measurement Method Standing Scale  








Active Medications











Generic Name Dose Route Start Last Admin





  Trade Name Freq  PRN Reason Stop Dose Admin


 


Acetaminophen  650 mg  11/01/19 15:00  11/03/19 08:10





  Tylenol -  PO  11/04/19 14:59  650 mg





  Q6H JUSTIN   Administration





     





     





     





     


 


Al Hydroxide/Mg Hydroxide  30 ml  11/01/19 12:44  





  Mylanta Oral Suspension -  PO   





  Q4H PRN   





  DYSPEPSIA   





     





     





     


 


Aspirin  325 mg  11/02/19 10:00  11/03/19 09:31





  Asa -  PO   325 mg





  BID JUSTIN   Administration





     





     





     





     


 


Calcium Carbonate  500 mg  11/02/19 10:00  11/03/19 09:32





  Os-Nir 500mg -  PO   500 mg





  DAILY JUSTIN   Administration





     





     





     





     


 


Fentanyl  50 mcg  11/01/19 12:39  11/01/19 12:50





  Sublimaze Injection -  IVPUSH   50 mcg





  [id number] PRN   Administration





  PAIN-PACU ORDER X 4 DOSES ONLY   





     





     





     


 


Morphine Sulfate  4 mg  11/01/19 12:56  11/02/19 11:09





  Morphine Injection -  IVPUSH   4 mg





  Q4H PRN   Administration





  PAIN LEVEL 7 - 10   





     





     





     


 


Multivitamins/Minerals/Vitamin C  1 tab  11/02/19 10:00  11/03/19 09:32





  Tab-A-Vit -  PO   1 tab





  DAILY JUSTIN   Administration





     





     





     





     


 


Ondansetron HCl  4 mg  11/01/19 12:44  11/01/19 12:41





  Zofran Injection  IVPUSH   4 mg





  Q6H PRN   Administration





  NAUSEA   





     





     





     


 


Oxycodone HCl  5 mg  11/01/19 12:54  11/03/19 10:03





  Roxicodone -  PO  11/04/19 12:55  5 mg





  Q4H PRN   Administration





  PAIN LEVEL 1-3   





     





     





     


 


Oxycodone HCl  10 mg  11/01/19 12:56  11/03/19 06:49





  Roxicodone -  PO  11/04/19 12:56  10 mg





  Q4H PRN   Administration





  PAIN LEVEL 4 - 6   





     





     





     


 


Pantoprazole Sodium  40 mg  11/02/19 10:00  11/03/19 09:32





  Protonix -  PO   40 mg





  DAILY JUSTIN   Administration





     





     





     





     


 


Pregabalin  150 mg  11/01/19 22:00  11/03/19 09:32





  Lyrica -  PO   150 mg





  BID JUSTIN   Administration





     





     





     





     


 


Promethazine HCl  12.5 mg  11/01/19 12:39  





  Phenergan Injection -  IVPUSH   





  Q6H PRN   





  NAUSEA-FOR RESCUE AFTER 15 MIN   





     





     





     


 


Ropinirole HCl  2 mg  11/01/19 22:00  11/02/19 22:31





  Requip -  PO   2 mg





  HS JUSTIN   Administration





     





     





     





     


 


Senna/Docusate Sodium  2 tablet  11/01/19 22:00  11/03/19 09:32





  Pericolace -  PO   2 tablet





  BID JUSTIN   Administration





     





     





     





     


 


Tramadol HCl  50 mg  11/01/19 12:39  11/03/19 08:11





  Ultram -  PO   50 mg





  Q3H PRN   Administration





  PAIN LEVEL 4 - 6   





     





     





     


 


Venlafaxine HCl  37.5 mg  11/01/19 22:00  11/03/19 09:32





  Effexor Xr -  PO   37.5 mg





  BID JUSTIN   Administration





     





     





     





     








Vital Signs (last)











Temp Pulse Resp BP Pulse Ox


 


 98.7 F   89   17   107/61   92 L


 


 11/03/19 05:00  11/03/19 07:36  11/03/19 07:36  11/03/19 07:36  11/03/19 05:00








Laboratory





 11/03/19 06:30 





 11/03/19 06:30 














ASSESSMENT AND PLAN





Ms. Zavala is a 74 year old female s/p right total knee arthroplasty, POD 2





- Pain control: Transition to oral pain medications, minimize narcotic use


- DVT prophylaxis -  BID x 6 weeks; SCD's while in bed


- Ice/Elevation of right knee


- F/U Labs


- Elevate HOB, encourage oral intake


- Appreciate medical management (Nutrition optimization, incentive spirometer, 

decubitus precautions heel/sacrum)


- PT twice daily; WBAT RLE 


- D/C Staples POD 14


- Change dressing POD 5, and Q3days thereafter


- Follow up in my office on POD 14 for X-rays and staple removal


- Dispo planning

## 2019-11-03 NOTE — PN
Progress Note (short form)





- Note


Progress Note: 


pain better controlled


 no bm 


 Vital Signs - 24 hr











  11/02/19 11/02/19 11/02/19





  18:00 21:00 22:00


 


Temperature 98.8 F  99.0 F


 


Pulse Rate 88  93 H


 


Respiratory 16 17 17





Rate   


 


Blood Pressure 134/73  140/72


 


O2 Sat by Pulse 94 L 96 94 L





Oximetry (%)   














  11/02/19 11/03/19 11/03/19





  22:33 01:00 EST 05:00


 


Temperature 99.1 F 99.5 F 98.7 F


 


Pulse Rate 93 H 101 H 89


 


Respiratory 17 17 17





Rate   


 


Blood Pressure 127/74 126/67 86/57 L


 


O2 Sat by Pulse  91 L 92 L





Oximetry (%)   














  11/03/19 11/03/19





  07:36 11:00


 


Temperature  98.2 F


 


Pulse Rate 89 84


 


Respiratory 17 18





Rate  


 


Blood Pressure 107/61 96/60


 


O2 Sat by Pulse  





Oximetry (%)  








 Current Medications











Generic Name Dose Route Start Last Admin





  Trade Name Freq  PRN Reason Stop Dose Admin


 


Acetaminophen  650 mg  11/01/19 15:00  11/03/19 08:10





  Tylenol -  PO  11/04/19 14:59  650 mg





  Q6H JUSTIN   Administration





     





     





     





     


 


Al Hydroxide/Mg Hydroxide  30 ml  11/01/19 12:44  





  Mylanta Oral Suspension -  PO   





  Q4H PRN   





  DYSPEPSIA   





     





     





     


 


Aspirin  325 mg  11/02/19 10:00  11/03/19 09:31





  Asa -  PO   325 mg





  BID JUSTIN   Administration





     





     





     





     


 


Calcium Carbonate  500 mg  11/02/19 10:00  11/03/19 09:32





  Os-Nir 500mg -  PO   500 mg





  DAILY JUSTIN   Administration





     





     





     





     


 


Fentanyl  50 mcg  11/01/19 12:39  11/01/19 12:50





  Sublimaze Injection -  IVPUSH   50 mcg





  [id number] PRN   Administration





  PAIN-PACU ORDER X 4 DOSES ONLY   





     





     





     


 


Morphine Sulfate  4 mg  11/01/19 12:56  11/02/19 11:09





  Morphine Injection -  IVPUSH   4 mg





  Q4H PRN   Administration





  PAIN LEVEL 7 - 10   





     





     





     


 


Multivitamins/Minerals/Vitamin C  1 tab  11/02/19 10:00  11/03/19 09:32





  Tab-A-Vit -  PO   1 tab





  DAILY JUSTIN   Administration





     





     





     





     


 


Ondansetron HCl  4 mg  11/01/19 12:44  11/01/19 12:41





  Zofran Injection  IVPUSH   4 mg





  Q6H PRN   Administration





  NAUSEA   





     





     





     


 


Oxycodone HCl  5 mg  11/01/19 12:54  11/03/19 10:03





  Roxicodone -  PO  11/04/19 12:55  5 mg





  Q4H PRN   Administration





  PAIN LEVEL 1-3   





     





     





     


 


Oxycodone HCl  10 mg  11/01/19 12:56  11/03/19 06:49





  Roxicodone -  PO  11/04/19 12:56  10 mg





  Q4H PRN   Administration





  PAIN LEVEL 4 - 6   





     





     





     


 


Pantoprazole Sodium  40 mg  11/02/19 10:00  11/03/19 09:32





  Protonix -  PO   40 mg





  DAILY JUSTIN   Administration





     





     





     





     


 


Polyethylene Glycol  255 gm  11/03/19 14:11  





  Miralax (For Bowel Prep) -  PO   





  DAILY PRN   





  CONSTIPATION   





     





     





     


 


Pregabalin  150 mg  11/01/19 22:00  11/03/19 09:32





  Lyrica -  PO   150 mg





  BID JUSTIN   Administration





     





     





     





     


 


Promethazine HCl  12.5 mg  11/01/19 12:39  





  Phenergan Injection -  IVPUSH   





  Q6H PRN   





  NAUSEA-FOR RESCUE AFTER 15 MIN   





     





     





     


 


Ropinirole HCl  2 mg  11/01/19 22:00  11/02/19 22:31





  Requip -  PO   2 mg





  HS JUSTIN   Administration





     





     





     





     


 


Saliva Substitute  1 applic  11/03/19 14:15  





  Mouthkote Solution -  MM   





  DAILY [facility]   





     





     





     





     


 


Senna/Docusate Sodium  2 tablet  11/01/19 22:00  11/03/19 09:32





  Pericolace -  PO   2 tablet





  BID [facility]   Administration





     





     





     





     


 


Tramadol HCl  50 mg  11/01/19 12:39  11/03/19 08:11





  Ultram -  PO   50 mg





  Q3H PRN   Administration





  PAIN LEVEL 4 - 6   





     





     





     


 


Venlafaxine HCl  37.5 mg  11/04/19 10:00  





  Effexor Xr -  PO   





  DAILY [facility]   





     





     





     





     








 Laboratory Results - last 24 hr











  11/03/19 11/03/19





  06:30 06:30


 


WBC  14.0 H 


 


RBC  3.66 


 


Hgb  10.2 L 


 


Hct  30.5 L 


 


MCV  83.3 


 


MCH  27.9 


 


MCHC  33.5 


 


RDW  14.1 


 


Plt Count  371 


 


MPV  10.3 


 


Absolute Neuts (auto)  10.6 


 


Neutrophils %  75.0 


 


Lymphocytes %  11.6 


 


Monocytes %  12.8 H 


 


Eosinophils %  0.3 


 


Basophils %  0.3 


 


Sodium   132 L


 


Potassium   4.3


 


Chloride   97 L


 


Carbon Dioxide   23


 


Anion Gap   12


 


BUN   11.0


 


Creatinine   0.6


 


Est GFR (CKD-EPI)AfAm   105.54


 


Est GFR (CKD-EPI)NonAf   91.06


 


Random Glucose   120 H


 


Calcium   8.7


 


Total Bilirubin   0.5


 


AST   22


 


ALT   12 L


 


Alkaline Phosphatase   86


 


Total Protein   5.7 L


 


Albumin   2.9 L








s 1 S2 RRR


Lungs clear


Abd- soft, NT


Edema Right leg








PLAN


Add miralax prn


noted elevated WBC-- possible leukemoid reaction to surgery 


Ortho follow up noted


 add Prosource


continue with PT


pt will need STR 





Problem List





- Problems


(1) Osteoarthritis of right knee


Code(s): M17.11 - UNILATERAL PRIMARY OSTEOARTHRITIS, RIGHT KNEE   





(2) Knee joint replacement status


Code(s): Z96.659 - PRESENCE OF UNSPECIFIED ARTIFICIAL KNEE JOINT   





(3) Fibromyalgia


Code(s): M79.7 - FIBROMYALGIA   





(4) Neuropathy


Code(s): G62.9 - POLYNEUROPATHY, UNSPECIFIED

## 2019-11-04 LAB
ALBUMIN SERPL-MCNC: 2.7 G/DL (ref 3.4–5)
ALP SERPL-CCNC: 135 U/L (ref 45–117)
ALT SERPL-CCNC: 31 U/L (ref 13–61)
ANION GAP SERPL CALC-SCNC: 12 MMOL/L (ref 8–16)
ANISOCYTOSIS BLD QL: (no result)
AST SERPL-CCNC: 69 U/L (ref 15–37)
BILIRUB SERPL-MCNC: 0.8 MG/DL (ref 0.2–1)
BUN SERPL-MCNC: 13 MG/DL (ref 7–18)
CALCIUM SERPL-MCNC: 8.6 MG/DL (ref 8.5–10)
CHLORIDE SERPL-SCNC: 96 MMOL/L (ref 98–107)
CO2 SERPL-SCNC: 24 MMOL/L (ref 21–32)
CREAT SERPL-MCNC: 0.6 MG/DL (ref 0.55–1.3)
DEPRECATED RDW RBC AUTO: 14.6 % (ref 11.6–15.6)
GLUCOSE SERPL-MCNC: 119 MG/DL (ref 74–106)
HCT VFR BLD CALC: 32 % (ref 32.4–45.2)
HGB BLD-MCNC: 10.4 GM/DL (ref 10.7–15.3)
MCH RBC QN AUTO: 27.4 PG (ref 25.7–33.7)
MCHC RBC AUTO-ENTMCNC: 32.6 G/DL (ref 32–36)
MCV RBC: 84 FL (ref 80–96)
PLATELET # BLD AUTO: 332 K/MM3 (ref 134–434)
PLATELET BLD QL SMEAR: ADEQUATE
PMV BLD: 10.4 FL (ref 7.5–11.1)
POTASSIUM SERPLBLD-SCNC: 4.5 MMOL/L (ref 3.5–5.1)
PROT SERPL-MCNC: 6 G/DL (ref 6.4–8.2)
RBC # BLD AUTO: 3.81 M/MM3 (ref 3.6–5.2)
SODIUM SERPL-SCNC: 132 MMOL/L (ref 136–145)
WBC # BLD AUTO: 13.4 K/MM3 (ref 4–10.8)

## 2019-11-04 RX ADMIN — Medication SCH APPLIC: at 10:00

## 2019-11-04 RX ADMIN — DOCUSATE SODIUM,SENNOSIDES SCH TABLET: 50; 8.6 TABLET, FILM COATED ORAL at 22:10

## 2019-11-04 RX ADMIN — ACETAMINOPHEN SCH MG: 325 TABLET ORAL at 02:47

## 2019-11-04 RX ADMIN — PREGABALIN SCH MG: 50 CAPSULE ORAL at 09:10

## 2019-11-04 RX ADMIN — Medication SCH ML: at 09:10

## 2019-11-04 RX ADMIN — PANTOPRAZOLE SODIUM SCH MG: 40 TABLET, DELAYED RELEASE ORAL at 09:12

## 2019-11-04 RX ADMIN — ASPIRIN 325 MG ORAL TABLET SCH MG: 325 PILL ORAL at 09:12

## 2019-11-04 RX ADMIN — PREGABALIN SCH MG: 50 CAPSULE ORAL at 22:10

## 2019-11-04 RX ADMIN — CALCIUM SCH MG: 500 TABLET ORAL at 09:11

## 2019-11-04 RX ADMIN — MULTIVITAMIN TABLET SCH TAB: TABLET at 09:10

## 2019-11-04 RX ADMIN — VENLAFAXINE HYDROCHLORIDE SCH MG: 37.5 CAPSULE, EXTENDED RELEASE ORAL at 09:11

## 2019-11-04 RX ADMIN — ROPINIROLE SCH MG: 1 TABLET, FILM COATED ORAL at 22:11

## 2019-11-04 RX ADMIN — DOCUSATE SODIUM,SENNOSIDES SCH TABLET: 50; 8.6 TABLET, FILM COATED ORAL at 09:10

## 2019-11-04 RX ADMIN — Medication SCH ML: at 17:57

## 2019-11-04 RX ADMIN — ACETAMINOPHEN SCH MG: 325 TABLET ORAL at 09:12

## 2019-11-04 RX ADMIN — ASPIRIN 325 MG ORAL TABLET SCH MG: 325 PILL ORAL at 22:10

## 2019-11-04 NOTE — PN
Progress Note (short form)





- Note


Progress Note: 





 ORTHOPEDIC SURGERY PROGRESS NOTE


                                       Department of Orthopedic Surgery





SUBJECTIVE





No acute events overnight. No complaints currently. Sitting comfortably in 

chair. Was able to ambulate with PT today. Denies chest pain, shortness of 

breath, or calf pain. No nausea or vomiting. Tolerating oral intake. Pain 

control improved. 





PHYSICAL EXAMINATION


General: Alert, oriented, cooperative and no distress.





Lower Extremity: Dressing clean/dry/intact; Dressings changed today. No 

discharge or signs of infection. Skin intact, no lesions, rashes or ulcers 

noted. Muscle mass equal and symmetric to contralateral side. No atrophy noted. 

No masses or effusions noted. No tenderness to palpation. LROM of the knee 

secondary to pain and swelling post-operatively - improved from yesterday. Full 

passive and active ROM of the ankle and foot, free from pain. No signs of 

compartment syndrome; compartments of the leg soft and compressible; EHL/TA/GS 

motor intact; SILT distally; 2+ DP pulses; Cap refill brisk.





DVT Exam: No evidence of DVT seen on physical exam; No cords or calf tenderness

; No significant calf/ankle edema.





Intake & Output











 11/03/19 11/03/19 11/04/19





 00:59 23:59 23:59


 


Intake Total   


 


Balance   


 


Intake:   


 


  Oral   


 


Other:   


 


  Voiding Method   Toilet


 


  # Unmeasured Voids   


 


    Colon   


 


    Void   








Active Medications











Generic Name Dose Route Start Last Admin





  Trade Name Freq  PRN Reason Stop Dose Admin


 


Acetaminophen  650 mg  11/01/19 15:00  11/04/19 02:47





  Tylenol -  PO  11/04/19 14:59  650 mg





  Q6H JUSTIN   Administration





     





     





     





     


 


Al Hydroxide/Mg Hydroxide  30 ml  11/01/19 12:44  





  Mylanta Oral Suspension -  PO   





  Q4H PRN   





  DYSPEPSIA   





     





     





     


 


Amino Acids  30 ml  11/03/19 17:30  11/03/19 17:10





  Prosource No Carb Liquid Pkt  PO   30 ml





  BID@0800,1730 JUSTIN   Administration





     





     





     





     


 


Aspirin  325 mg  11/02/19 10:00  11/03/19 22:25





  Asa -  PO   325 mg





  BID JUSTIN   Administration





     





     





     





     


 


Calcium Carbonate  500 mg  11/02/19 10:00  11/03/19 09:32





  Os-Nir 500mg -  PO   500 mg





  DAILY JUSTIN   Administration





     





     





     





     


 


Fentanyl  50 mcg  11/01/19 12:39  11/01/19 12:50





  Sublimaze Injection -  IVPUSH   50 mcg





  [id number] PRN   Administration





  PAIN-PACU ORDER X 4 DOSES ONLY   





     





     





     


 


Morphine Sulfate  4 mg  11/01/19 12:56  11/02/19 11:09





  Morphine Injection -  IVPUSH   4 mg





  Q4H PRN   Administration





  PAIN LEVEL 7 - 10   





     





     





     


 


Multivitamins/Minerals/Vitamin C  1 tab  11/02/19 10:00  11/03/19 09:32





  Tab-A-Vit -  PO   1 tab





  DAILY JUSTIN   Administration





     





     





     





     


 


Ondansetron HCl  4 mg  11/01/19 12:44  11/01/19 12:41





  Zofran Injection  IVPUSH   4 mg





  Q6H PRN   Administration





  NAUSEA   





     





     





     


 


Oxycodone HCl  5 mg  11/01/19 12:54  11/03/19 17:00





  Roxicodone -  PO  11/04/19 12:55  5 mg





  Q4H PRN   Administration





  PAIN LEVEL 1-3   





     





     





     


 


Oxycodone HCl  10 mg  11/01/19 12:56  11/04/19 02:47





  Roxicodone -  PO  11/04/19 12:56  10 mg





  Q4H PRN   Administration





  PAIN LEVEL 4 - 6   





     





     





     


 


Pantoprazole Sodium  40 mg  11/02/19 10:00  11/03/19 09:32





  Protonix -  PO   40 mg





  DAILY JUSTIN   Administration





     





     





     





     


 


Polyethylene Glycol  255 gm  11/03/19 14:11  11/03/19 16:15





  Miralax (For Bowel Prep) -  PO   17 grams





  DAILY PRN   Administration





  CONSTIPATION   





     





     





     


 


Pregabalin  150 mg  11/01/19 22:00  11/03/19 22:27





  Lyrica -  PO   150 mg





  BID JUSTIN   Administration





     





     





     





     


 


Promethazine HCl  12.5 mg  11/01/19 12:39  





  Phenergan Injection -  IVPUSH   





  Q6H PRN   





  NAUSEA-FOR RESCUE AFTER 15 MIN   





     





     





     


 


Ropinirole HCl  2 mg  11/01/19 22:00  11/03/19 22:25





  Requip -  PO   2 mg





  HS JUSTIN   Administration





     





     





     





     


 


Saliva Substitute  1 applic  11/03/19 14:15  11/03/19 14:29





  Mouthkote Solution -  MM   1 applic





  DAILY JUSTIN   Administration





     





     





     





     


 


Senna/Docusate Sodium  2 tablet  11/01/19 22:00  11/03/19 22:26





  Pericolace -  PO   2 tablet





  BID JUSTIN   Administration





     





     





     





     


 


Tramadol HCl  50 mg  11/01/19 12:39  11/03/19 08:11





  Ultram -  PO   50 mg





  Q3H PRN   Administration





  PAIN LEVEL 4 - 6   





     





     





     


 


Venlafaxine HCl  37.5 mg  11/04/19 10:00  





  Effexor Xr -  PO   





  DAILY JUSTIN   





     





     





     





     








Vital Signs (last)











Temp Pulse Resp BP Pulse Ox


 


 98.7 F   94 H  19   127/71   96 


 


 11/04/19 06:36  11/04/19 06:36  11/04/19 06:36  11/04/19 06:36  11/04/19 06:36








Laboratory





 11/04/19 07:20 

















ASSESSMENT AND PLAN





Ms. Zavala is a 74 year old female s/p right total knee arthroplasty, POD 3





- Pain control: Transition to oral pain medications, minimize narcotic use


- DVT prophylaxis -  BID x 6 weeks; SCD's while in bed.


- Ice/Elevation of right knee


- Elevate HOB, encourage oral intake


- Appreciate medical management (Nutrition optimization, incentive spirometer, 

decubitus precautions heel/sacrum)


- PT twice daily; WBAT RLE 


- D/C Staples POD 14


- Follow up in my office on POD 14 for X-rays and staple removal


- Dispo planning

## 2019-11-04 NOTE — PN
Progress Note (short form)





- Note


Progress Note: 


She wants to go home


  really is


 


 Vital Signs - 24 hr











  11/03/19 11/03/19 11/03/19





  14:17 18:00 21:00


 


Temperature 97.9 F 98.2 F 


 


Pulse Rate 83 87 


 


Respiratory 18 19 19





Rate   


 


Blood Pressure 119/60 126/79 


 


O2 Sat by Pulse  98 98





Oximetry (%)   














  11/03/19 11/03/19 11/04/19





  22:00 23:00 01:37


 


Temperature  99.1 F 99.4 F


 


Pulse Rate  90 93 H


 


Respiratory  19 19





Rate   


 


Blood Pressure  129/74 117/69


 


O2 Sat by Pulse 98  93 L





Oximetry (%)   














  11/04/19 11/04/19 11/04/19





  03:06 06:36 10:00


 


Temperature  98.7 F 98.5 F


 


Pulse Rate  94 H 79


 


Respiratory  19 19





Rate   


 


Blood Pressure  127/71 112/59 L


 


O2 Sat by Pulse 96 96 95





Oximetry (%)   








 Current Medications











Generic Name Dose Route Start Last Admin





  Trade Name Freq  PRN Reason Stop Dose Admin


 


Acetaminophen  650 mg  11/01/19 15:00  11/04/19 09:12





  Tylenol -  PO  11/04/19 14:59  650 mg





  Q6H JUSTIN   Administration





     





     





     





     


 


Al Hydroxide/Mg Hydroxide  30 ml  11/01/19 12:44  





  Mylanta Oral Suspension -  PO   





  Q4H PRN   





  DYSPEPSIA   





     





     





     


 


Amino Acids  30 ml  11/03/19 17:30  11/04/19 09:10





  Prosource No Carb Liquid Pkt  PO   30 ml





  BID@0800,1730 JUSTIN   Administration





     





     





     





     


 


Aspirin  325 mg  11/02/19 10:00  11/04/19 09:12





  Asa -  PO   325 mg





  BID JUSTIN   Administration





     





     





     





     


 


Calcium Carbonate  500 mg  11/02/19 10:00  11/04/19 09:11





  Os-Nir 500mg -  PO   500 mg





  DAILY JUSTIN   Administration





     





     





     





     


 


Fentanyl  50 mcg  11/01/19 12:39  11/01/19 12:50





  Sublimaze Injection -  IVPUSH   50 mcg





  [id number] PRN   Administration





  PAIN-PACU ORDER X 4 DOSES ONLY   





     





     





     


 


Morphine Sulfate  4 mg  11/01/19 12:56  11/02/19 11:09





  Morphine Injection -  IVPUSH   4 mg





  Q4H PRN   Administration





  PAIN LEVEL 7 - 10   





     





     





     


 


Multivitamins/Minerals/Vitamin C  1 tab  11/02/19 10:00  11/04/19 09:10





  Tab-A-Vit -  PO   1 tab





  DAILY JUSTIN   Administration





     





     





     





     


 


Ondansetron HCl  4 mg  11/01/19 12:44  11/01/19 12:41





  Zofran Injection  IVPUSH   4 mg





  Q6H PRN   Administration





  NAUSEA   





     





     





     


 


Oxycodone HCl  5 mg  11/01/19 12:54  11/03/19 17:00





  Roxicodone -  PO  11/04/19 12:55  5 mg





  Q4H PRN   Administration





  PAIN LEVEL 1-3   





     





     





     


 


Oxycodone HCl  10 mg  11/01/19 12:56  11/04/19 09:18





  Roxicodone -  PO  11/04/19 12:56  10 mg





  Q4H PRN   Administration





  PAIN LEVEL 4 - 6   





     





     





     


 


Pantoprazole Sodium  40 mg  11/02/19 10:00  11/04/19 09:12





  Protonix -  PO   40 mg





  DAILY JUSTIN   Administration





     





     





     





     


 


Polyethylene Glycol  255 gm  11/03/19 14:11  11/03/19 16:15





  Miralax (For Bowel Prep) -  PO   17 grams





  DAILY PRN   Administration





  CONSTIPATION   





     





     





     


 


Pregabalin  150 mg  11/01/19 22:00  11/04/19 09:10





  Lyrica -  PO   150 mg





  BID JUSTIN   Administration





     





     





     





     


 


Promethazine HCl  12.5 mg  11/01/19 12:39  





  Phenergan Injection -  IVPUSH   





  Q6H PRN   





  NAUSEA-FOR RESCUE AFTER 15 MIN   





     





     





     


 


Ropinirole HCl  2 mg  11/01/19 22:00  11/03/19 22:25





  Requip -  PO   2 mg





  HS JUSTIN   Administration





     





     





     





     


 


Saliva Substitute  1 applic  11/03/19 14:15  11/03/19 14:29





  Mouthkote Solution -  MM   1 applic





  DAILY JUSTIN   Administration





     





     





     





     


 


Senna/Docusate Sodium  2 tablet  11/01/19 22:00  11/04/19 09:10





  Pericolace -  PO   2 tablet





  BID JUSTIN   Administration





     





     





     





     


 


Tramadol HCl  50 mg  11/01/19 12:39  11/03/19 08:11





  Ultram -  PO   50 mg





  Q3H PRN   Administration





  PAIN LEVEL 4 - 6   





     





     





     


 


Venlafaxine HCl  37.5 mg  11/04/19 10:00  11/04/19 09:11





  Effexor Xr -  PO   37.5 mg





  DAILY JUSTIN   Administration





     





     





     





     








 Laboratory Results - last 24 hr











  11/04/19 11/04/19





  07:20 07:20


 


WBC  13.4 H 


 


RBC  3.81 


 


Hgb  10.4 L 


 


Hct  32.0 L 


 


MCV  84.0 


 


MCH  27.4 


 


MCHC  32.6 


 


RDW  14.6 


 


Plt Count  332 


 


MPV  10.4 


 


Absolute Neuts (auto)  10.3 


 


Neutrophils %  No Result Required. 


 


Neutrophils % (Manual)  78.0 


 


Lymphocytes %  No Result Required. 


 


Lymphocytes % (Manual)  12.0 


 


Monocytes % (Manual)  7 


 


Eosinophils % (Manual)  2.0 


 


Basophils % (Manual)  1.0 


 


Hypochromia  1+ 


 


Platelet Estimate  Adequate 


 


Platelet Comment  Giant platelets 


 


Anisocytosis  1+ 


 


Sodium   132 L


 


Potassium   4.5


 


Chloride   96 L


 


Carbon Dioxide   24


 


Anion Gap   12


 


BUN   13.0


 


Creatinine   0.6


 


Est GFR (CKD-EPI)AfAm   105.54


 


Est GFR (CKD-EPI)NonAf   91.06


 


Random Glucose   119 H


 


Calcium   8.6


 


Total Bilirubin   0.8


 


AST   69 H


 


ALT   31


 


Alkaline Phosphatase   135 H D


 


Total Protein   6.0 L


 


Albumin   2.7 L








S1 S2 RRR


Lungs clear


Abd-- soft, NT


edema Right leg 








PLAN


Avoid Morphine now-- will dc Morphine


OOB daily


still encouraged pt to go to [facility] but she wants to go home instead


ASA for DVT prophylaxis


Sodium is the same 


WBC decreased


 





Problem List





- Problems


(1) Osteoarthritis of right knee


Code(s): M17.11 - UNILATERAL PRIMARY OSTEOARTHRITIS, RIGHT KNEE   





(2) Knee joint replacement status


Code(s): Z96.659 - PRESENCE OF UNSPECIFIED ARTIFICIAL KNEE JOINT   





(3) Fibromyalgia


Code(s): M79.7 - FIBROMYALGIA   





(4) Neuropathy


Code(s): G62.9 - POLYNEUROPATHY, UNSPECIFIED

## 2019-11-05 VITALS — HEART RATE: 88 BPM | TEMPERATURE: 98.4 F | DIASTOLIC BLOOD PRESSURE: 68 MMHG | SYSTOLIC BLOOD PRESSURE: 117 MMHG

## 2019-11-05 LAB
BASOPHILS # BLD: 0.4 % (ref 0–2)
DEPRECATED RDW RBC AUTO: 13.9 % (ref 11.6–15.6)
EOSINOPHIL # BLD: 0.9 % (ref 0–4.5)
HCT VFR BLD CALC: 29 % (ref 32.4–45.2)
HGB BLD-MCNC: 9.7 GM/DL (ref 10.7–15.3)
LYMPHOCYTES # BLD: 13.9 % (ref 8–40)
MCH RBC QN AUTO: 28.1 PG (ref 25.7–33.7)
MCHC RBC AUTO-ENTMCNC: 33.5 G/DL (ref 32–36)
MCV RBC: 83.7 FL (ref 80–96)
MONOCYTES # BLD AUTO: 9.5 % (ref 3.8–10.2)
NEUTROPHILS # BLD: 75.3 % (ref 42.8–82.8)
PLATELET # BLD AUTO: 470 K/MM3 (ref 134–434)
PMV BLD: 9 FL (ref 7.5–11.1)
RBC # BLD AUTO: 3.47 M/MM3 (ref 3.6–5.2)
WBC # BLD AUTO: 9.5 K/MM3 (ref 4–10.8)

## 2019-11-05 RX ADMIN — CALCIUM SCH MG: 500 TABLET ORAL at 09:46

## 2019-11-05 RX ADMIN — DOCUSATE SODIUM,SENNOSIDES SCH TABLET: 50; 8.6 TABLET, FILM COATED ORAL at 09:47

## 2019-11-05 RX ADMIN — PANTOPRAZOLE SODIUM SCH MG: 40 TABLET, DELAYED RELEASE ORAL at 09:46

## 2019-11-05 RX ADMIN — PREGABALIN SCH MG: 50 CAPSULE ORAL at 09:47

## 2019-11-05 RX ADMIN — ONDANSETRON PRN MG: 2 INJECTION INTRAMUSCULAR; INTRAVENOUS at 00:23

## 2019-11-05 RX ADMIN — Medication SCH ML: at 08:43

## 2019-11-05 RX ADMIN — ASPIRIN 325 MG ORAL TABLET SCH MG: 325 PILL ORAL at 09:46

## 2019-11-05 RX ADMIN — VENLAFAXINE HYDROCHLORIDE SCH MG: 37.5 CAPSULE, EXTENDED RELEASE ORAL at 09:46

## 2019-11-05 RX ADMIN — MULTIVITAMIN TABLET SCH TAB: TABLET at 09:47

## 2019-11-05 RX ADMIN — Medication SCH APPLIC: at 09:47

## 2019-11-05 NOTE — PATH
Surgical Pathology Report



Patient Name:  SIMON BUENO

Accession #:  [id number]

Med. Rec. #:  [id number]                                                        

   /Age/Gender:  1945 (Age: 74) / F

Account:  [id number]                                                          

             Location: [facility]

Taken:  2019

Received:  2019

Reported:  2019

Physicians:  Dileep Dyson MD

  



Specimen(s) Received

 BONES RIGHT KNEE 





Clinical History

Osteoarthritis right knee







Final Diagnosis

BONES, KNEE, RIGHT, TOTAL KNEE REPLACEMENT MAKOPLASTY:

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





***Electronically Signed***

Sindhu Kimble M.D.





Gross Description

Received in formalin labeled "bone right knee," is a 10.0 x 8.5 x 2.0 cm

aggregate of multiple portions of bone and soft tissue, consistent with knee

bones. There are multiple areas of eburnation identified, measuring up to 1.8 cm

in greatest dimension. The remaining articular surfaces are tan-yellow and

diffusely granular. The underlying trabecular bone is yellow and hard.

Representative sections are submitted in one cassette, following

decalcification. 

/2019



[place]

## 2019-11-05 NOTE — PN
Progress Note (short form)





- Note


Progress Note: 





 ORTHOPEDIC SURGERY PROGRESS NOTE


                                                    Department of Orthopedic 

Surgery





SUBJECTIVE





No acute events overnight. No complaints currently. Sitting comfortably in 

chair. Was able to ambulate with PT yesterday. Denies chest pain, shortness of 

breath, or calf pain. No nausea or vomiting. Tolerating oral intake. Pain 

control improved. 





PHYSICAL EXAMINATION


General: Alert, oriented, cooperative and no distress.





Lower Extremity: Dressing clean/dry/intact; No discharge or signs of infection. 

Skin intact, no lesions, rashes or ulcers noted. Muscle mass equal and 

symmetric to contralateral side. No atrophy noted. No masses or effusions 

noted. No tenderness to palpation. LROM of the knee secondary to pain and 

swelling post-operatively - improved from yesterday. Full passive and active 

ROM of the ankle and foot, free from pain. No signs of compartment syndrome; 

compartments of the leg soft and compressible; EHL/TA/GS motor intact; SILT 

distally; 2+ DP pulses; Cap refill brisk.





DVT Exam: No evidence of DVT seen on physical exam; No cords or calf tenderness

; No significant calf/ankle edema.





Intake & Output











 11/03/19 11/04/19 11/05/19





 23:59 23:59 23:59


 


Intake Total  300 


 


Balance  300 


 


Intake:   


 


  Oral  300 


 


Other:   


 


  Voiding Method  Toilet Toilet


 


  # Unmeasured Voids   


 


    Void   








Active Medications











Generic Name Dose Route Start Last Admin





  Trade Name Freq  PRN Reason Stop Dose Admin


 


Al Hydroxide/Mg Hydroxide  30 ml  11/01/19 12:44  





  Mylanta Oral Suspension -  PO   





  Q4H PRN   





  DYSPEPSIA   





     





     





     


 


Amino Acids  30 ml  11/03/19 17:30  11/04/19 17:57





  Prosource No Carb Liquid Pkt  PO   30 ml





  BID@0800,1730 JUSTIN   Administration





     





     





     





     


 


Aspirin  325 mg  11/02/19 10:00  11/04/19 22:10





  Asa -  PO   325 mg





  BID JUSTIN   Administration





     





     





     





     


 


Calcium Carbonate  500 mg  11/02/19 10:00  11/04/19 09:11





  Os-Nir 500mg -  PO   500 mg





  DAILY JUSTIN   Administration





     





     





     





     


 


Morphine Sulfate  4 mg  11/05/19 07:39  





  Morphine Sulfate  IVPUSH  11/05/19 19:23  





  Q4H PRN   





  PAIN LEVEL 7 - 10   





     





     





     


 


Multivitamins/Minerals/Vitamin C  1 tab  11/02/19 10:00  11/04/19 09:10





  Tab-A-Vit -  PO   1 tab





  DAILY JUSTIN   Administration





     





     





     





     


 


Ondansetron HCl  4 mg  11/01/19 12:44  11/05/19 00:23





  Zofran Injection  IVPUSH   4 mg





  Q6H PRN   Administration





  NAUSEA   





     





     





     


 


Oxycodone HCl  5 mg  11/04/19 16:07  





  Roxicodone -  PO   





  Q4H PRN   





  PAIN LEVEL 6-10   





     





     





     


 


Pantoprazole Sodium  40 mg  11/02/19 10:00  11/04/19 09:12





  Protonix -  PO   40 mg





  DAILY JUSTIN   Administration





     





     





     





     


 


Polyethylene Glycol  255 gm  11/03/19 14:11  11/03/19 16:15





  Miralax (For Bowel Prep) -  PO   17 grams





  DAILY PRN   Administration





  CONSTIPATION   





     





     





     


 


Pregabalin  150 mg  11/01/19 22:00  11/04/19 22:10





  Lyrica -  PO   150 mg





  BID JUSTIN   Administration





     





     





     





     


 


Ropinirole HCl  2 mg  11/01/19 22:00  11/04/19 22:11





  Requip -  PO   2 mg





  HS JUSTIN   Administration





     





     





     





     


 


Saliva Substitute  1 applic  11/03/19 14:15  11/04/19 10:00





  Mouthkote Solution -  MM   1 applic





  DAILY JUSTIN   Administration





     





     





     





     


 


Senna/Docusate Sodium  2 tablet  11/01/19 22:00  11/04/19 22:10





  Pericolace -  PO   2 tablet





  BID JUSTIN   Administration





     





     





     





     


 


Venlafaxine HCl  37.5 mg  11/04/19 10:00  11/04/19 09:11





  Effexor Xr -  PO   37.5 mg





  DAILY JUSTIN   Administration





     





     





     





     








Vital Signs (last)











Temp Pulse Resp BP Pulse Ox


 


 98.9 F   87   19   111/63   99 


 


 11/05/19 06:00  11/05/19 06:00  11/05/19 06:00  11/05/19 06:00  11/05/19 06:26








Laboratory





 11/04/19 07:20 





 11/04/19 07:20 











ASSESSMENT AND PLAN





Ms. Zavala is a 74 year old female s/p right total knee arthroplasty, POD 4





- Pain control: Transition to oral pain medications, minimize narcotic use


- DVT prophylaxis -  BID x 6 weeks; SCD's while in bed.


- Ice/Elevation of right knee (keep pillows under leg / ankle only, do not 

place pillows under the knee)


- F/U AM labs


- Elevate HOB, encourage oral intake


- Appreciate medical management (Nutrition optimization, incentive spirometer, 

decubitus precautions heel/sacrum)


- PT twice daily; WBAT RLE 


- D/C Staples POD 14


- Follow up in my office on POD 14 for X-rays and staple removal


- Dispo planning

## 2019-11-05 NOTE — DS
Physical Examination


Vital Signs: 


 Vital Signs











Temperature  98.8 F   11/05/19 09:53


 


Pulse Rate  77   11/05/19 09:53


 


Respiratory Rate  19   11/05/19 09:53


 


Blood Pressure  130/61   11/05/19 09:53


 


O2 Sat by Pulse Oximetry (%)  99   11/05/19 08:58











Constitutional: Yes: No Distress


Cardiovascular: Yes: Regular Rate and Rhythm


Respiratory: Yes: CTA Bilaterally


Gastrointestinal: Yes: Normal Bowel Sounds, Soft, Abdomen, Obese.  No: 

Tenderness


Edema: Yes


Edema: RLE: 2+


Labs: 


 CBC, BMP





 11/04/19 07:20 





 11/04/19 07:20 











Discharge Summary


Problems reviewed: Yes


Reason For Visit: RIGHT KNEE OSTEOARTHRITIS


Current Active Problems





Fibromyalgia (Acute)


Knee joint replacement status (Acute)


Neuropathy (Acute)


Osteoarthritis of right knee (Acute)








Hospital Course: 


admitted for elective total knee replacement - right 11/1/19


post op uneventful 


WBC trending down


She had nausea today-- due to pain meds


She usually takes Tylenol #3 at home-- will send to pharmacy 


encourage fluid intake, protein intake , MVI , Calcium 


She will have Home PT


stable for dc home





Plan of Treatment: 


Per Dr Dileep Dyson--


Ms. Zavala is a 74 year old female s/p right total knee arthroplasty, POD 4





- Pain control: Transition to oral pain medications, minimize narcotic use


- DVT prophylaxis -  BID x 6 weeks; SCD's while in bed.


- Ice/Elevation of right knee (keep pillows under leg / ankle only, do not 

place pillows under the knee)


- Elevate HOB, encourage oral intake


- PT twice daily; WBAT RLE 


- D/C Staples POD 14


- Follow up in my office on POD 14 for X-rays and staple removal








Condition: Improved





- Instructions


Disposition: HOME





- Home Medications


Comprehensive Discharge Medication List: 


Ambulatory Orders





Acetaminophen [Tylenol] 650 mg PO BID PRN 03/18/19 


Calcium Carbonate [Calcium] 500 mg PO DAILY 10/28/19 


Multivitamins [Tab-A-Vit -] 1 tab PO DAILY 10/28/19 


Oxycodone HCl 5 mg PO ASDIR PRN 10/28/19 


Pregabalin [Lyrica -] 150 mg PO BID 10/28/19 


Ranitidine HCl 150 mg PO BID PRN 10/28/19 


Ropinirole HCl 2 mg PO HS 10/28/19 


Venlafaxine HCl ER [Effexor Xr -] 37.5 mg PO BID 10/28/19

## 2023-05-22 ENCOUNTER — OFFICE (OUTPATIENT)
Dept: URBAN - METROPOLITAN AREA CLINIC 121 | Facility: CLINIC | Age: 78
Setting detail: OPHTHALMOLOGY
End: 2023-05-22
Payer: MEDICARE

## 2023-05-22 ENCOUNTER — RX ONLY (RX ONLY)
Age: 78
End: 2023-05-22

## 2023-05-22 DIAGNOSIS — H35.3131: ICD-10-CM

## 2023-05-22 DIAGNOSIS — H25.13: ICD-10-CM

## 2023-05-22 DIAGNOSIS — H40.023: ICD-10-CM

## 2023-05-22 DIAGNOSIS — H16.223: ICD-10-CM

## 2023-05-22 PROCEDURE — 99213 OFFICE O/P EST LOW 20 MIN: CPT | Performed by: OPHTHALMOLOGY

## 2023-05-22 ASSESSMENT — REFRACTION_CURRENTRX
OD_AXIS: 180
OS_CYLINDER: SPH
OD_OVR_VA: 20/
OD_CYLINDER: SPH
OS_OVR_VA: 20/
OS_ADD: +1.75
OS_AXIS: 180
OD_SPHERE: +1.75
OD_ADD: +1.75
OS_SPHERE: +1.75

## 2023-05-22 ASSESSMENT — KERATOMETRY
OS_AXISANGLE_DEGREES: 094
METHOD_AUTO_MANUAL: MANUAL
OD_K1POWER_DIOPTERS: 42.25
OS_K2POWER_DIOPTERS: 443.00
OD_K2POWER_DIOPTERS: 42.75
OS_K1POWER_DIOPTERS: 42.25
OD_AXISANGLE_DEGREES: 106

## 2023-05-22 ASSESSMENT — REFRACTION_AUTOREFRACTION
OD_SPHERE: +2.00
OS_AXIS: 140
OS_CYLINDER: +0.50
OS_SPHERE: +2.25
OD_CYLINDER: +1.00
OD_AXIS: 018

## 2023-05-22 ASSESSMENT — TONOMETRY
OS_IOP_MMHG: 17
OD_IOP_MMHG: 17

## 2023-05-22 ASSESSMENT — SPHEQUIV_DERIVED
OS_SPHEQUIV: 2.5
OD_SPHEQUIV: 2.5

## 2023-05-22 ASSESSMENT — REFRACTION_MANIFEST
OD_AXIS: 180
OS_CYLINDER: SPH
OD_CYLINDER: SPH
OS_ADD: +1.75
OD_ADD: +1.75
OS_VA1: 20/40
OD_SPHERE: +1.75
OS_SPHERE: +1.75
OS_AXIS: 180
OD_VA1: 20/40

## 2023-05-22 ASSESSMENT — PACHYMETRY
OD_CT_CORRECTION: 4
OD_CT_UM: 483
OS_CT_CORRECTION: 6
OS_CT_UM: 468

## 2023-05-22 ASSESSMENT — AXIALLENGTH_DERIVED
OS_AL: 5.7
OD_AL: 23.0004

## 2023-05-22 ASSESSMENT — SUPERFICIAL PUNCTATE KERATITIS (SPK)
OD_SPK: 1+
OS_SPK: 1+

## 2023-05-22 ASSESSMENT — CONFRONTATIONAL VISUAL FIELD TEST (CVF)
OS_FINDINGS: FULL
OD_FINDINGS: FULL

## 2023-05-22 ASSESSMENT — VISUAL ACUITY
OD_BCVA: 20/40
OS_BCVA: 20/40

## 2023-08-22 ENCOUNTER — OFFICE (OUTPATIENT)
Dept: URBAN - METROPOLITAN AREA CLINIC 121 | Facility: CLINIC | Age: 78
Setting detail: OPHTHALMOLOGY
End: 2023-08-22
Payer: MEDICARE

## 2023-08-22 DIAGNOSIS — H40.023: ICD-10-CM

## 2023-08-22 PROCEDURE — 99212 OFFICE O/P EST SF 10 MIN: CPT | Performed by: OPHTHALMOLOGY

## 2023-08-22 PROCEDURE — 92083 EXTENDED VISUAL FIELD XM: CPT | Performed by: OPHTHALMOLOGY

## 2023-08-22 PROCEDURE — 92133 CPTRZD OPH DX IMG PST SGM ON: CPT | Performed by: OPHTHALMOLOGY

## 2023-08-22 ASSESSMENT — REFRACTION_CURRENTRX
OS_AXIS: 180
OS_OVR_VA: 20/
OD_SPHERE: +1.75
OS_CYLINDER: SPH
OD_OVR_VA: 20/
OS_ADD: +1.75
OD_AXIS: 180
OD_ADD: +1.75
OS_SPHERE: +1.75
OD_CYLINDER: SPH

## 2023-08-22 ASSESSMENT — KERATOMETRY
OS_K2POWER_DIOPTERS: 443.00
METHOD_AUTO_MANUAL: MANUAL
OD_K2POWER_DIOPTERS: 42.75
OS_AXISANGLE_DEGREES: 094
OS_K1POWER_DIOPTERS: 42.25
OD_AXISANGLE_DEGREES: 106
OD_K1POWER_DIOPTERS: 42.25

## 2023-08-22 ASSESSMENT — REFRACTION_AUTOREFRACTION
OD_AXIS: 018
OS_AXIS: 140
OS_SPHERE: +2.25
OD_SPHERE: +2.00
OS_CYLINDER: +0.50
OD_CYLINDER: +1.00

## 2023-08-22 ASSESSMENT — REFRACTION_MANIFEST
OD_CYLINDER: SPH
OS_CYLINDER: SPH
OS_ADD: +1.75
OD_SPHERE: +1.75
OS_AXIS: 180
OD_ADD: +1.75
OD_VA1: 20/40
OD_AXIS: 180
OS_VA1: 20/40
OS_SPHERE: +1.75

## 2023-08-22 ASSESSMENT — VISUAL ACUITY
OD_BCVA: 20/40
OS_BCVA: 20/40

## 2023-08-22 ASSESSMENT — SPHEQUIV_DERIVED
OD_SPHEQUIV: 2.5
OS_SPHEQUIV: 2.5

## 2023-08-22 ASSESSMENT — AXIALLENGTH_DERIVED
OD_AL: 23.0004
OS_AL: 5.7

## 2023-08-22 ASSESSMENT — CONFRONTATIONAL VISUAL FIELD TEST (CVF)
OS_FINDINGS: FULL
OD_FINDINGS: FULL

## 2023-08-22 ASSESSMENT — SUPERFICIAL PUNCTATE KERATITIS (SPK)
OS_SPK: 1+
OD_SPK: 1+

## 2023-09-15 ENCOUNTER — OFFICE (OUTPATIENT)
Dept: URBAN - METROPOLITAN AREA CLINIC 121 | Facility: CLINIC | Age: 78
Setting detail: OPHTHALMOLOGY
End: 2023-09-15
Payer: MEDICARE

## 2023-09-15 DIAGNOSIS — H40.023: ICD-10-CM

## 2023-09-15 DIAGNOSIS — H16.223: ICD-10-CM

## 2023-09-15 DIAGNOSIS — H25.13: ICD-10-CM

## 2023-09-15 DIAGNOSIS — H35.3131: ICD-10-CM

## 2023-09-15 PROCEDURE — 99214 OFFICE O/P EST MOD 30 MIN: CPT | Performed by: OPHTHALMOLOGY

## 2023-09-15 PROCEDURE — 92250 FUNDUS PHOTOGRAPHY W/I&R: CPT | Performed by: OPHTHALMOLOGY

## 2023-09-15 ASSESSMENT — KERATOMETRY
OS_AXISANGLE_DEGREES: 091
METHOD_AUTO_MANUAL: AUTO
OD_AXISANGLE_DEGREES: 124
OS_K2POWER_DIOPTERS: 42.75
OD_K1POWER_DIOPTERS: 42.25
OS_K1POWER_DIOPTERS: 42.25
OD_K2POWER_DIOPTERS: 42.75

## 2023-09-15 ASSESSMENT — REFRACTION_CURRENTRX
OS_ADD: +1.75
OS_SPHERE: +1.75
OD_ADD: +1.75
OD_AXIS: 180
OS_AXIS: 180
OS_CYLINDER: SPH
OD_CYLINDER: SPH
OD_SPHERE: +1.75
OD_OVR_VA: 20/
OS_OVR_VA: 20/

## 2023-09-15 ASSESSMENT — PACHYMETRY
OS_CT_CORRECTION: 6
OS_CT_UM: 468
OD_CT_UM: 483
OD_CT_CORRECTION: 4

## 2023-09-15 ASSESSMENT — TONOMETRY
OD_IOP_MMHG: 14
OS_IOP_MMHG: 14

## 2023-09-15 ASSESSMENT — SPHEQUIV_DERIVED
OS_SPHEQUIV: 2.75
OD_SPHEQUIV: 2.75

## 2023-09-15 ASSESSMENT — REFRACTION_AUTOREFRACTION
OS_CYLINDER: +1.00
OS_SPHERE: +2.25
OD_CYLINDER: +1.00
OS_AXIS: 169
OD_AXIS: 014
OD_SPHERE: +2.25

## 2023-09-15 ASSESSMENT — REFRACTION_MANIFEST
OS_VA1: 20/40
OD_VA1: 20/40
OD_SPHERE: +1.75
OD_CYLINDER: SPH
OS_SPHERE: +1.75
OS_AXIS: 180
OS_CYLINDER: SPH
OS_ADD: +1.75
OD_ADD: +1.75
OD_AXIS: 180

## 2023-09-15 ASSESSMENT — CONFRONTATIONAL VISUAL FIELD TEST (CVF)
OS_FINDINGS: FULL
OD_FINDINGS: FULL

## 2023-09-15 ASSESSMENT — SUPERFICIAL PUNCTATE KERATITIS (SPK)
OD_SPK: 1+
OS_SPK: 1+

## 2023-09-15 ASSESSMENT — AXIALLENGTH_DERIVED
OS_AL: 22.9082
OD_AL: 22.9082

## 2023-09-15 ASSESSMENT — VISUAL ACUITY
OS_BCVA: 20/40
OD_BCVA: 20/40

## 2023-12-04 ENCOUNTER — OFFICE (OUTPATIENT)
Dept: URBAN - METROPOLITAN AREA CLINIC 121 | Facility: CLINIC | Age: 78
Setting detail: OPHTHALMOLOGY
End: 2023-12-04
Payer: MEDICARE

## 2023-12-04 DIAGNOSIS — H35.3131: ICD-10-CM

## 2023-12-04 DIAGNOSIS — H25.13: ICD-10-CM

## 2023-12-04 DIAGNOSIS — H16.223: ICD-10-CM

## 2023-12-04 DIAGNOSIS — H40.023: ICD-10-CM

## 2023-12-04 PROCEDURE — 99213 OFFICE O/P EST LOW 20 MIN: CPT | Performed by: OPHTHALMOLOGY

## 2023-12-04 PROCEDURE — 92133 CPTRZD OPH DX IMG PST SGM ON: CPT | Performed by: OPHTHALMOLOGY

## 2023-12-04 ASSESSMENT — REFRACTION_CURRENTRX
OS_CYLINDER: +0.25
OS_AXIS: 032
OD_SPHERE: +1.75
OD_ADD: +2.00
OD_OVR_VA: 20/
OS_SPHERE: +1.75
OD_CYLINDER: +0.25
OS_ADD: +2.00
OD_AXIS: 142
OS_OVR_VA: 20/

## 2023-12-04 ASSESSMENT — REFRACTION_MANIFEST
OS_CYLINDER: +0.25
OS_AXIS: 032
OS_SPHERE: +2.00
OD_ADD: +1.75
OD_CYLINDER: SPH
OD_VA1: 20/40
OD_SPHERE: +1.75
OD_ADD: +2.00
OS_AXIS: 180
OD_AXIS: 180
OD_SPHERE: +2.00
OS_ADD: +2.00
OD_AXIS: 142
OS_CYLINDER: SPH
OS_VA1: 20/40
OS_ADD: +1.75
OD_CYLINDER: +0.25
OS_SPHERE: +1.75

## 2023-12-04 ASSESSMENT — REFRACTION_AUTOREFRACTION
OD_CYLINDER: +1.00
OS_CYLINDER: +1.00
OS_SPHERE: +2.25
OD_SPHERE: +2.25
OD_AXIS: 014
OS_AXIS: 169

## 2023-12-04 ASSESSMENT — CONFRONTATIONAL VISUAL FIELD TEST (CVF)
OS_FINDINGS: FULL
OD_FINDINGS: FULL

## 2023-12-04 ASSESSMENT — SPHEQUIV_DERIVED
OS_SPHEQUIV: 2.75
OS_SPHEQUIV: 2.125
OD_SPHEQUIV: 2.125
OD_SPHEQUIV: 2.75

## 2023-12-04 ASSESSMENT — SUPERFICIAL PUNCTATE KERATITIS (SPK)
OS_SPK: 1+
OD_SPK: 1+

## 2024-03-04 ENCOUNTER — OFFICE (OUTPATIENT)
Dept: URBAN - METROPOLITAN AREA CLINIC 121 | Facility: CLINIC | Age: 79
Setting detail: OPHTHALMOLOGY
End: 2024-03-04
Payer: MEDICARE

## 2024-03-04 DIAGNOSIS — H40.023: ICD-10-CM

## 2024-03-04 DIAGNOSIS — H25.13: ICD-10-CM

## 2024-03-04 DIAGNOSIS — H35.3131: ICD-10-CM

## 2024-03-04 DIAGNOSIS — H16.223: ICD-10-CM

## 2024-03-04 PROCEDURE — 92012 INTRM OPH EXAM EST PATIENT: CPT | Performed by: OPHTHALMOLOGY

## 2024-03-04 ASSESSMENT — REFRACTION_MANIFEST
OD_SPHERE: +2.00
OS_ADD: +1.75
OD_AXIS: 180
OS_SPHERE: +1.75
OS_ADD: +2.00
OD_SPHERE: +1.75
OS_CYLINDER: +0.25
OS_VA1: 20/40
OD_CYLINDER: +0.25
OS_SPHERE: +2.00
OD_ADD: +2.00
OD_CYLINDER: SPH
OS_AXIS: 032
OD_VA1: 20/40
OD_ADD: +1.75
OS_AXIS: 180
OD_AXIS: 142
OS_CYLINDER: SPH

## 2024-03-04 ASSESSMENT — SPHEQUIV_DERIVED
OS_SPHEQUIV: 2.125
OD_SPHEQUIV: 2.125

## 2024-03-04 ASSESSMENT — REFRACTION_CURRENTRX
OS_AXIS: 032
OD_AXIS: 142
OD_CYLINDER: +0.25
OD_SPHERE: +1.75
OS_SPHERE: +1.75
OS_CYLINDER: +0.25
OD_ADD: +2.00
OD_OVR_VA: 20/
OS_ADD: +2.00
OS_OVR_VA: 20/

## 2024-06-24 ENCOUNTER — OFFICE (OUTPATIENT)
Dept: URBAN - METROPOLITAN AREA CLINIC 121 | Facility: CLINIC | Age: 79
Setting detail: OPHTHALMOLOGY
End: 2024-06-24
Payer: MEDICARE

## 2024-06-24 DIAGNOSIS — H25.13: ICD-10-CM

## 2024-06-24 DIAGNOSIS — H40.023: ICD-10-CM

## 2024-06-24 DIAGNOSIS — H35.3131: ICD-10-CM

## 2024-06-24 DIAGNOSIS — H16.223: ICD-10-CM

## 2024-06-24 PROCEDURE — 92250 FUNDUS PHOTOGRAPHY W/I&R: CPT | Performed by: OPHTHALMOLOGY

## 2024-06-24 PROCEDURE — 92014 COMPRE OPH EXAM EST PT 1/>: CPT | Performed by: OPHTHALMOLOGY

## 2024-06-24 ASSESSMENT — CONFRONTATIONAL VISUAL FIELD TEST (CVF)
OS_FINDINGS: FULL
OD_FINDINGS: FULL

## 2024-09-09 ENCOUNTER — OFFICE (OUTPATIENT)
Dept: URBAN - METROPOLITAN AREA CLINIC 121 | Facility: CLINIC | Age: 79
Setting detail: OPHTHALMOLOGY
End: 2024-09-09
Payer: MEDICARE

## 2024-09-09 DIAGNOSIS — H16.223: ICD-10-CM

## 2024-09-09 DIAGNOSIS — H35.3131: ICD-10-CM

## 2024-09-09 DIAGNOSIS — H25.13: ICD-10-CM

## 2024-09-09 DIAGNOSIS — H40.023: ICD-10-CM

## 2024-09-09 PROCEDURE — 99213 OFFICE O/P EST LOW 20 MIN: CPT | Performed by: OPHTHALMOLOGY

## 2024-09-09 ASSESSMENT — CONFRONTATIONAL VISUAL FIELD TEST (CVF)
OS_FINDINGS: FULL
OD_FINDINGS: FULL

## 2025-05-23 ENCOUNTER — OFFICE (OUTPATIENT)
Facility: LOCATION | Age: 80
Setting detail: OPHTHALMOLOGY
End: 2025-05-23
Payer: MEDICARE

## 2025-05-23 DIAGNOSIS — H40.023: ICD-10-CM

## 2025-05-23 DIAGNOSIS — H35.3131: ICD-10-CM

## 2025-05-23 DIAGNOSIS — H16.223: ICD-10-CM

## 2025-05-23 DIAGNOSIS — H25.13: ICD-10-CM

## 2025-05-23 PROCEDURE — 92012 INTRM OPH EXAM EST PATIENT: CPT | Performed by: OPHTHALMOLOGY

## 2025-05-23 PROCEDURE — 92083 EXTENDED VISUAL FIELD XM: CPT | Performed by: OPHTHALMOLOGY

## 2025-05-23 ASSESSMENT — REFRACTION_CURRENTRX
OS_SPHERE: +2.00
OS_SPHERE: +1.75
OD_OVR_VA: 20/
OD_CYLINDER: +0.50
OS_ADD: +1.75
OD_CYLINDER: +0.25
OS_CYLINDER: +0.25
OD_ADD: +2.00
OD_OVR_VA: 20/
OS_AXIS: 053
OD_SPHERE: +1.75
OS_AXIS: 032
OS_OVR_VA: 20/
OD_SPHERE: +2.00
OD_AXIS: 132
OS_ADD: +2.00
OS_CYLINDER: +0.25
OS_OVR_VA: 20/
OD_AXIS: 142
OD_ADD: +1.75

## 2025-05-23 ASSESSMENT — REFRACTION_MANIFEST
OD_ADD: +1.75
OD_SPHERE: +1.75
OS_CYLINDER: SPH
OD_VA1: 20/40
OS_VA1: 20/40
OD_AXIS: 142
OS_AXIS: 032
OS_AXIS: 180
OS_SPHERE: +2.00
OD_AXIS: 180
OS_SPHERE: +1.75
OS_CYLINDER: +0.25
OD_CYLINDER: +0.25
OD_ADD: +2.00
OD_SPHERE: +2.00
OS_ADD: +2.00
OS_ADD: +1.75
OD_CYLINDER: SPH

## 2025-05-23 ASSESSMENT — CONFRONTATIONAL VISUAL FIELD TEST (CVF)
OS_FINDINGS: FULL
OD_FINDINGS: FULL

## 2025-05-23 ASSESSMENT — REFRACTION_AUTOREFRACTION
OD_SPHERE: +1.75
OS_AXIS: 137
OD_AXIS: 034
OD_CYLINDER: +1.25
OS_SPHERE: +2.50
OS_CYLINDER: +0.50

## 2025-05-23 ASSESSMENT — KERATOMETRY
METHOD_AUTO_MANUAL: AUTO
OS_K2POWER_DIOPTERS: 42.75
OD_K1POWER_DIOPTERS: 42.25
OD_K2POWER_DIOPTERS: 42.50
OD_AXISANGLE_DEGREES: 111
OS_K1POWER_DIOPTERS: 42.25
OS_AXISANGLE_DEGREES: 098

## 2025-05-23 ASSESSMENT — SUPERFICIAL PUNCTATE KERATITIS (SPK)
OD_SPK: 2+
OS_SPK: 2+

## 2025-05-23 ASSESSMENT — TONOMETRY
OS_IOP_MMHG: 12
OD_IOP_MMHG: 14

## 2025-05-23 ASSESSMENT — PACHYMETRY
OD_CT_UM: 483
OS_CT_CORRECTION: 6
OS_CT_UM: 468
OD_CT_CORRECTION: 4

## 2025-05-23 ASSESSMENT — VISUAL ACUITY
OS_BCVA: 20/50
OD_BCVA: 20/40